# Patient Record
Sex: MALE | Race: OTHER | Employment: OTHER | URBAN - METROPOLITAN AREA
[De-identification: names, ages, dates, MRNs, and addresses within clinical notes are randomized per-mention and may not be internally consistent; named-entity substitution may affect disease eponyms.]

---

## 2022-07-03 ENCOUNTER — APPOINTMENT (OUTPATIENT)
Dept: GENERAL RADIOLOGY | Age: 62
DRG: 194 | End: 2022-07-03
Payer: COMMERCIAL

## 2022-07-03 ENCOUNTER — HOSPITAL ENCOUNTER (INPATIENT)
Age: 62
LOS: 1 days | Discharge: ANOTHER HOSPITAL | DRG: 194 | End: 2022-07-04
Attending: EMERGENCY MEDICINE | Admitting: FAMILY MEDICINE
Payer: COMMERCIAL

## 2022-07-03 DIAGNOSIS — N17.9 AKI (ACUTE KIDNEY INJURY) (HCC): Primary | ICD-10-CM

## 2022-07-03 DIAGNOSIS — R77.8 ELEVATED TROPONIN: ICD-10-CM

## 2022-07-03 DIAGNOSIS — R53.1 GENERAL WEAKNESS: ICD-10-CM

## 2022-07-03 DIAGNOSIS — I95.9 HYPOTENSION, UNSPECIFIED HYPOTENSION TYPE: ICD-10-CM

## 2022-07-03 DIAGNOSIS — E87.20 LACTIC ACIDOSIS: ICD-10-CM

## 2022-07-03 PROBLEM — J18.9 COMMUNITY ACQUIRED PNEUMONIA, BILATERAL: Status: ACTIVE | Noted: 2022-07-03

## 2022-07-03 LAB
ALBUMIN SERPL-MCNC: 4 G/DL (ref 3.5–4.6)
ALP BLD-CCNC: 73 U/L (ref 35–104)
ALT SERPL-CCNC: 14 U/L (ref 0–41)
AMMONIA: 16 UMOL/L (ref 16–60)
ANION GAP SERPL CALCULATED.3IONS-SCNC: 13 MEQ/L (ref 9–15)
APTT: 47.8 SEC (ref 24.4–36.8)
AST SERPL-CCNC: 19 U/L (ref 0–40)
BASOPHILS ABSOLUTE: 0 K/UL (ref 0–0.2)
BASOPHILS RELATIVE PERCENT: 0.1 %
BILIRUB SERPL-MCNC: <0.2 MG/DL (ref 0.2–0.7)
BUN BLDV-MCNC: 31 MG/DL (ref 8–23)
CALCIUM SERPL-MCNC: 12.8 MG/DL (ref 8.5–9.9)
CHLORIDE BLD-SCNC: 101 MEQ/L (ref 95–107)
CO2: 26 MEQ/L (ref 20–31)
CREAT SERPL-MCNC: 1.98 MG/DL (ref 0.7–1.2)
EOSINOPHILS ABSOLUTE: 0 K/UL (ref 0–0.7)
EOSINOPHILS RELATIVE PERCENT: 0.7 %
GFR AFRICAN AMERICAN: 41.7
GFR NON-AFRICAN AMERICAN: 34.4
GLOBULIN: 3.7 G/DL (ref 2.3–3.5)
GLUCOSE BLD-MCNC: 193 MG/DL (ref 70–99)
GLUCOSE BLD-MCNC: 238 MG/DL (ref 70–99)
HCT VFR BLD CALC: 28.4 % (ref 42–52)
HEMOGLOBIN: 9.5 G/DL (ref 14–18)
INR BLD: 2.9
LACTIC ACID: 3.5 MMOL/L (ref 0.5–2.2)
LYMPHOCYTES ABSOLUTE: 1.2 K/UL (ref 1–4.8)
LYMPHOCYTES RELATIVE PERCENT: 21 %
MAGNESIUM: 2.4 MG/DL (ref 1.7–2.4)
MCH RBC QN AUTO: 29.6 PG (ref 27–31.3)
MCHC RBC AUTO-ENTMCNC: 33.5 % (ref 33–37)
MCV RBC AUTO: 88.2 FL (ref 80–100)
MONOCYTES ABSOLUTE: 0.4 K/UL (ref 0.2–0.8)
MONOCYTES RELATIVE PERCENT: 7.4 %
NEUTROPHILS ABSOLUTE: 4.1 K/UL (ref 1.4–6.5)
NEUTROPHILS RELATIVE PERCENT: 70.8 %
PDW BLD-RTO: 15.3 % (ref 11.5–14.5)
PERFORMED ON: ABNORMAL
PLATELET # BLD: 187 K/UL (ref 130–400)
POTASSIUM SERPL-SCNC: 3.8 MEQ/L (ref 3.4–4.9)
PROCALCITONIN: 1.15 NG/ML (ref 0–0.15)
PROTHROMBIN TIME: 29.3 SEC (ref 12.3–14.9)
RBC # BLD: 3.21 M/UL (ref 4.7–6.1)
SODIUM BLD-SCNC: 140 MEQ/L (ref 135–144)
TOTAL PROTEIN: 7.7 G/DL (ref 6.3–8)
TROPONIN: 0.22 NG/ML (ref 0–0.01)
WBC # BLD: 5.9 K/UL (ref 4.8–10.8)

## 2022-07-03 PROCEDURE — 83605 ASSAY OF LACTIC ACID: CPT

## 2022-07-03 PROCEDURE — 83735 ASSAY OF MAGNESIUM: CPT

## 2022-07-03 PROCEDURE — 83036 HEMOGLOBIN GLYCOSYLATED A1C: CPT

## 2022-07-03 PROCEDURE — 71045 X-RAY EXAM CHEST 1 VIEW: CPT

## 2022-07-03 PROCEDURE — G0378 HOSPITAL OBSERVATION PER HR: HCPCS

## 2022-07-03 PROCEDURE — 85730 THROMBOPLASTIN TIME PARTIAL: CPT

## 2022-07-03 PROCEDURE — 6360000002 HC RX W HCPCS: Performed by: INTERNAL MEDICINE

## 2022-07-03 PROCEDURE — 96374 THER/PROPH/DIAG INJ IV PUSH: CPT

## 2022-07-03 PROCEDURE — 99285 EMERGENCY DEPT VISIT HI MDM: CPT

## 2022-07-03 PROCEDURE — 80053 COMPREHEN METABOLIC PANEL: CPT

## 2022-07-03 PROCEDURE — 93005 ELECTROCARDIOGRAM TRACING: CPT | Performed by: EMERGENCY MEDICINE

## 2022-07-03 PROCEDURE — 6360000002 HC RX W HCPCS: Performed by: EMERGENCY MEDICINE

## 2022-07-03 PROCEDURE — 87040 BLOOD CULTURE FOR BACTERIA: CPT

## 2022-07-03 PROCEDURE — 84145 PROCALCITONIN (PCT): CPT

## 2022-07-03 PROCEDURE — 6370000000 HC RX 637 (ALT 250 FOR IP): Performed by: FAMILY MEDICINE

## 2022-07-03 PROCEDURE — 84484 ASSAY OF TROPONIN QUANT: CPT

## 2022-07-03 PROCEDURE — 2580000003 HC RX 258: Performed by: FAMILY MEDICINE

## 2022-07-03 PROCEDURE — 96375 TX/PRO/DX INJ NEW DRUG ADDON: CPT

## 2022-07-03 PROCEDURE — 85610 PROTHROMBIN TIME: CPT

## 2022-07-03 PROCEDURE — 36415 COLL VENOUS BLD VENIPUNCTURE: CPT

## 2022-07-03 PROCEDURE — 2580000003 HC RX 258: Performed by: EMERGENCY MEDICINE

## 2022-07-03 PROCEDURE — 82140 ASSAY OF AMMONIA: CPT

## 2022-07-03 PROCEDURE — 6370000000 HC RX 637 (ALT 250 FOR IP): Performed by: INTERNAL MEDICINE

## 2022-07-03 PROCEDURE — 6360000002 HC RX W HCPCS: Performed by: FAMILY MEDICINE

## 2022-07-03 PROCEDURE — 85025 COMPLETE CBC W/AUTO DIFF WBC: CPT

## 2022-07-03 RX ORDER — SODIUM CHLORIDE 0.9 % (FLUSH) 0.9 %
5-40 SYRINGE (ML) INJECTION PRN
Status: DISCONTINUED | OUTPATIENT
Start: 2022-07-03 | End: 2022-07-04 | Stop reason: HOSPADM

## 2022-07-03 RX ORDER — TACROLIMUS 0.5 MG/1
0.5 CAPSULE ORAL 2 TIMES DAILY
Status: DISCONTINUED | OUTPATIENT
Start: 2022-07-03 | End: 2022-07-04 | Stop reason: HOSPADM

## 2022-07-03 RX ORDER — ONDANSETRON 2 MG/ML
4 INJECTION INTRAMUSCULAR; INTRAVENOUS EVERY 6 HOURS PRN
Status: DISCONTINUED | OUTPATIENT
Start: 2022-07-03 | End: 2022-07-04 | Stop reason: HOSPADM

## 2022-07-03 RX ORDER — AZITHROMYCIN 500 MG/1
500 TABLET, FILM COATED ORAL EVERY 24 HOURS
Status: DISCONTINUED | OUTPATIENT
Start: 2022-07-03 | End: 2022-07-04 | Stop reason: HOSPADM

## 2022-07-03 RX ORDER — ONDANSETRON 4 MG/1
4 TABLET, ORALLY DISINTEGRATING ORAL EVERY 8 HOURS PRN
Status: DISCONTINUED | OUTPATIENT
Start: 2022-07-03 | End: 2022-07-04 | Stop reason: HOSPADM

## 2022-07-03 RX ORDER — GABAPENTIN 300 MG/1
300 CAPSULE ORAL 3 TIMES DAILY
COMMUNITY
Start: 2022-05-31 | End: 2022-11-27

## 2022-07-03 RX ORDER — INSULIN GLARGINE 100 [IU]/ML
22 INJECTION, SOLUTION SUBCUTANEOUS NIGHTLY
Status: DISCONTINUED | OUTPATIENT
Start: 2022-07-03 | End: 2022-07-04

## 2022-07-03 RX ORDER — PANTOPRAZOLE SODIUM 40 MG/1
40 TABLET, DELAYED RELEASE ORAL DAILY
COMMUNITY
Start: 2022-05-05

## 2022-07-03 RX ORDER — POLYETHYLENE GLYCOL 3350 17 G/17G
17 POWDER, FOR SOLUTION ORAL DAILY PRN
Status: DISCONTINUED | OUTPATIENT
Start: 2022-07-03 | End: 2022-07-04 | Stop reason: HOSPADM

## 2022-07-03 RX ORDER — 0.9 % SODIUM CHLORIDE 0.9 %
1000 INTRAVENOUS SOLUTION INTRAVENOUS ONCE
Status: COMPLETED | OUTPATIENT
Start: 2022-07-03 | End: 2022-07-03

## 2022-07-03 RX ORDER — ACETAMINOPHEN 650 MG/1
650 SUPPOSITORY RECTAL EVERY 6 HOURS PRN
Status: DISCONTINUED | OUTPATIENT
Start: 2022-07-03 | End: 2022-07-04 | Stop reason: HOSPADM

## 2022-07-03 RX ORDER — LANOLIN ALCOHOL/MO/W.PET/CERES
100 CREAM (GRAM) TOPICAL DAILY
COMMUNITY
Start: 2022-04-01

## 2022-07-03 RX ORDER — ENOXAPARIN SODIUM 100 MG/ML
70 INJECTION SUBCUTANEOUS 2 TIMES DAILY
COMMUNITY
Start: 2022-06-15

## 2022-07-03 RX ORDER — FENTANYL CITRATE 50 UG/ML
50 INJECTION, SOLUTION INTRAMUSCULAR; INTRAVENOUS ONCE
Status: COMPLETED | OUTPATIENT
Start: 2022-07-03 | End: 2022-07-03

## 2022-07-03 RX ORDER — FLUDROCORTISONE ACETATE 0.1 MG/1
0.1 TABLET ORAL DAILY
COMMUNITY
Start: 2022-04-26

## 2022-07-03 RX ORDER — INSULIN LISPRO 100 [IU]/ML
INJECTION, SOLUTION INTRAVENOUS; SUBCUTANEOUS
COMMUNITY
Start: 2022-03-31

## 2022-07-03 RX ORDER — SODIUM CHLORIDE 0.9 % (FLUSH) 0.9 %
5-40 SYRINGE (ML) INJECTION EVERY 12 HOURS SCHEDULED
Status: DISCONTINUED | OUTPATIENT
Start: 2022-07-03 | End: 2022-07-04 | Stop reason: HOSPADM

## 2022-07-03 RX ORDER — ENOXAPARIN SODIUM 100 MG/ML
40 INJECTION SUBCUTANEOUS DAILY
Status: DISCONTINUED | OUTPATIENT
Start: 2022-07-03 | End: 2022-07-03

## 2022-07-03 RX ORDER — DEXTROSE MONOHYDRATE 50 MG/ML
100 INJECTION, SOLUTION INTRAVENOUS PRN
Status: DISCONTINUED | OUTPATIENT
Start: 2022-07-03 | End: 2022-07-04 | Stop reason: HOSPADM

## 2022-07-03 RX ORDER — INSULIN LISPRO 100 [IU]/ML
0-6 INJECTION, SOLUTION INTRAVENOUS; SUBCUTANEOUS NIGHTLY
Status: DISCONTINUED | OUTPATIENT
Start: 2022-07-03 | End: 2022-07-04

## 2022-07-03 RX ORDER — SODIUM CHLORIDE 9 MG/ML
INJECTION, SOLUTION INTRAVENOUS CONTINUOUS
Status: DISCONTINUED | OUTPATIENT
Start: 2022-07-03 | End: 2022-07-04 | Stop reason: HOSPADM

## 2022-07-03 RX ORDER — VENLAFAXINE HYDROCHLORIDE 75 MG/1
75 CAPSULE, EXTENDED RELEASE ORAL DAILY
Status: DISCONTINUED | OUTPATIENT
Start: 2022-07-04 | End: 2022-07-04 | Stop reason: HOSPADM

## 2022-07-03 RX ORDER — WARFARIN SODIUM 2.5 MG/1
TABLET ORAL
COMMUNITY
Start: 2022-01-13

## 2022-07-03 RX ORDER — INSULIN GLARGINE 100 [IU]/ML
22 INJECTION, SOLUTION SUBCUTANEOUS NIGHTLY
Status: DISCONTINUED | OUTPATIENT
Start: 2022-07-03 | End: 2022-07-03 | Stop reason: SDUPTHER

## 2022-07-03 RX ORDER — INSULIN LISPRO 100 [IU]/ML
0-12 INJECTION, SOLUTION INTRAVENOUS; SUBCUTANEOUS
Status: DISCONTINUED | OUTPATIENT
Start: 2022-07-04 | End: 2022-07-04

## 2022-07-03 RX ORDER — SULFAMETHOXAZOLE AND TRIMETHOPRIM 400; 80 MG/1; MG/1
1 TABLET ORAL DAILY
COMMUNITY
Start: 2022-04-05

## 2022-07-03 RX ORDER — ONDANSETRON 2 MG/ML
4 INJECTION INTRAMUSCULAR; INTRAVENOUS ONCE
Status: COMPLETED | OUTPATIENT
Start: 2022-07-03 | End: 2022-07-03

## 2022-07-03 RX ORDER — INSULIN ASPART 100 [IU]/ML
INJECTION, SOLUTION INTRAVENOUS; SUBCUTANEOUS
COMMUNITY
Start: 2022-04-22

## 2022-07-03 RX ORDER — TACROLIMUS 0.5 MG/1
0.5 CAPSULE ORAL 2 TIMES DAILY
COMMUNITY
Start: 2022-05-31

## 2022-07-03 RX ORDER — SODIUM CHLORIDE 9 MG/ML
INJECTION, SOLUTION INTRAVENOUS PRN
Status: DISCONTINUED | OUTPATIENT
Start: 2022-07-03 | End: 2022-07-04 | Stop reason: HOSPADM

## 2022-07-03 RX ORDER — VENLAFAXINE HYDROCHLORIDE 37.5 MG/1
75 CAPSULE, EXTENDED RELEASE ORAL DAILY
COMMUNITY
Start: 2022-06-06 | End: 2022-12-03

## 2022-07-03 RX ORDER — GABAPENTIN 300 MG/1
300 CAPSULE ORAL 3 TIMES DAILY
Status: DISCONTINUED | OUTPATIENT
Start: 2022-07-03 | End: 2022-07-04 | Stop reason: HOSPADM

## 2022-07-03 RX ORDER — ACETAMINOPHEN 325 MG/1
650 TABLET ORAL EVERY 6 HOURS PRN
Status: DISCONTINUED | OUTPATIENT
Start: 2022-07-03 | End: 2022-07-04 | Stop reason: HOSPADM

## 2022-07-03 RX ORDER — MAGNESIUM OXIDE 400 MG/1
400 TABLET ORAL 2 TIMES DAILY
COMMUNITY
Start: 2022-06-06

## 2022-07-03 RX ADMIN — TACROLIMUS 0.5 MG: 0.5 CAPSULE ORAL at 22:25

## 2022-07-03 RX ADMIN — INSULIN GLARGINE 22 UNITS: 100 INJECTION, SOLUTION SUBCUTANEOUS at 22:27

## 2022-07-03 RX ADMIN — METOPROLOL TARTRATE 25 MG: 25 TABLET, FILM COATED ORAL at 22:25

## 2022-07-03 RX ADMIN — ACETAMINOPHEN 650 MG: 325 TABLET ORAL at 22:24

## 2022-07-03 RX ADMIN — FENTANYL CITRATE 50 MCG: 50 INJECTION, SOLUTION INTRAMUSCULAR; INTRAVENOUS at 17:29

## 2022-07-03 RX ADMIN — AZITHROMYCIN MONOHYDRATE 500 MG: 500 TABLET ORAL at 22:25

## 2022-07-03 RX ADMIN — SODIUM CHLORIDE 1000 ML: 9 INJECTION, SOLUTION INTRAVENOUS at 17:34

## 2022-07-03 RX ADMIN — ONDANSETRON 4 MG: 2 INJECTION INTRAMUSCULAR; INTRAVENOUS at 17:28

## 2022-07-03 RX ADMIN — CEFTRIAXONE SODIUM 1000 MG: 1 INJECTION, POWDER, FOR SOLUTION INTRAMUSCULAR; INTRAVENOUS at 22:36

## 2022-07-03 RX ADMIN — SODIUM CHLORIDE: 9 INJECTION, SOLUTION INTRAVENOUS at 19:46

## 2022-07-03 RX ADMIN — Medication 10 ML: at 22:39

## 2022-07-03 RX ADMIN — GABAPENTIN 300 MG: 300 CAPSULE ORAL at 22:25

## 2022-07-03 RX ADMIN — INSULIN LISPRO 2 UNITS: 100 INJECTION, SOLUTION INTRAVENOUS; SUBCUTANEOUS at 22:27

## 2022-07-03 ASSESSMENT — PAIN SCALES - GENERAL
PAINLEVEL_OUTOF10: 10
PAINLEVEL_OUTOF10: 3
PAINLEVEL_OUTOF10: 10
PAINLEVEL_OUTOF10: 0

## 2022-07-03 ASSESSMENT — PAIN DESCRIPTION - ORIENTATION
ORIENTATION: LEFT
ORIENTATION: LEFT

## 2022-07-03 ASSESSMENT — PAIN DESCRIPTION - LOCATION
LOCATION: LEG

## 2022-07-03 ASSESSMENT — PAIN DESCRIPTION - PAIN TYPE: TYPE: CHRONIC PAIN

## 2022-07-03 ASSESSMENT — ENCOUNTER SYMPTOMS
BACK PAIN: 0
SORE THROAT: 0
CHEST TIGHTNESS: 0
SHORTNESS OF BREATH: 0
COUGH: 0
ABDOMINAL PAIN: 0
VOMITING: 0
DIARRHEA: 0
NAUSEA: 0

## 2022-07-03 ASSESSMENT — PAIN - FUNCTIONAL ASSESSMENT: PAIN_FUNCTIONAL_ASSESSMENT: 0-10

## 2022-07-03 NOTE — H&P
glargine (LANTUS;BASAGLAR) 100 UNIT/ML injection pen Inject 22 Units into the skin nightly 3/31/22  Yes Historical Provider, MD   insulin aspart (NOVOLOG FLEXPEN) 100 UNIT/ML injection pen INJECT BEFORE MEALS ON A SLIDING SCALE UP TO 15 UNITS A DAY 4/22/22  Yes Historical Provider, MD   gabapentin (NEURONTIN) 300 MG capsule Take 300 mg by mouth 3 times daily. 5/31/22 11/27/22 Yes Historical Provider, MD   fludrocortisone (FLORINEF) 0.1 MG tablet Take 0.1 mg by mouth daily 4/26/22  Yes Historical Provider, MD   enoxaparin (LOVENOX) 80 MG/0.8ML Inject 70 mg into the skin 2 times daily 6/15/22  Yes Historical Provider, MD       Allergies:  Patient has no known allergies. Social History:   TOBACCO:   has no history on file for tobacco use. ETOH:   has no history on file for alcohol use. Family History:   No family history on file. REVIEW OF SYSTEMS:  Ten systems reviewed and negative except for as above. Physical Exam:    Vitals: BP 94/61   Pulse 78   Temp 97.9 °F (36.6 °C)   Resp 18   Ht 5' 6\" (1.676 m)   Wt 170 lb (77.1 kg)   SpO2 98%   BMI 27.44 kg/m²   Constitutional: alert, appears stated age and cooperative  Skin: Skin color, texture, turgor normal. No rashes or lesions  Eyes:Eye: Normal external eye, conjunctiva, BEA. ENT: Head: Normocephalic, no lesions, without obvious abnormality. Neck: no adenopathy, no carotid bruit, no JVD, supple, symmetrical, trachea midline and thyroid not enlarged, symmetric, no tenderness/mass/nodules  Respiratory: clear to auscultation bilaterally  Cardiovascular: regular rate and rhythm, S1, S2 normal, no murmur, click, rub or gallop  Gastrointestinal: soft, non-tender; bowel sounds normal; no masses,  no organomegaly  Genitourinary: Deferred  Musculoskeletal:extremities normal, atraumatic, no cyanosis or edema  Neurologic: Mental status AAOx3 No facial asymmetry or droop. Normal muscle strength b/l. CN II-XII grossly intact.   Psychiatric: Appropriate mood and affect. Good insight and judgement  Hematologic: No obvious bruising or bleeding    Recent Labs     07/03/22  1639   WBC 5.9   HGB 9.5*        Recent Labs     07/03/22  1639      K 3.8      CO2 26   BUN 31*   CREATININE 1.98*   GLUCOSE 193*   AST 19   ALT 14   BILITOT <0.2   ALKPHOS 73     Troponin T:   Recent Labs     07/03/22  1639   TROPONINI 0.221*       INR:   Recent Labs     07/03/22  1639   INR 2.9     URINALYSIS:No results for input(s): NITRITE, COLORU, PHUR, LABCAST, WBCUA, RBCUA, MUCUS, TRICHOMONAS, YEAST, BACTERIA, CLARITYU, SPECGRAV, LEUKOCYTESUR, UROBILINOGEN, BILIRUBINUR, BLOODU, GLUCOSEU, AMORPHOUS in the last 72 hours. Invalid input(s): Adele Turner  -----------------------------------------------------------------   XR CHEST PORTABLE    Result Date: 7/3/2022  XR CHEST PORTABLE Clinical History:  Weakness. Comparison:  5/20/2011. RESULT: Shallow inspiration. Bibasilar opacity versus atelectasis. Possible small bilateral pleural effusions versus pleural thickening. No pneumothorax. Stable cardiomediastinal silhouette. Surgical clips upper abdomen. No distinct acute osseous finding. Bibasilar opacity versus atelectasis. Possible small bilateral pleural effusions versus pleural thickening. Assessment and Plan   1. Community acquired PNA, bilateral, with hx immunocompromise  1. Ceftriaxone and azithromycin, id consult  2. ZAIN on ckd3  1. Unsure of most recent baseline, at least has ckd 3, this may be near baseline  3. Hx liver transplant x 3, alcoholic cirrhosis  4. DM2  5.  DVT proph    Patient Active Problem List   Diagnosis Code    Community acquired pneumonia, bilateral J18.9       Clair Goodson MD, MD  Admitting Hospitalist    Emergency Contact:

## 2022-07-03 NOTE — ED PROVIDER NOTES
[address] [facility] ED  eMERGENCYdEPARTMENT eNCOUnter      Pt Name: Trista Phelan  MRN: [id number]  Colleengfnazanin 1960  Date of evaluation: 7/3/2022  Bernardo Cruz MD    CHIEF COMPLAINT           HPI  Trista Phelan is a 64 y.o. male per chart review has a h/o alcoholic cirrhosis, liver transplant, spinal stenosis, DM, lupus presents to the ED with c/o gradual onset of constant, moderate, generalized weakness x 3 days. States he is light-headed, feels weak and tired. Pt also notes left thigh pain, makes walking harder. Thigh pain has been chronic for the past year, but worsened recently. Pt denies CP, SOB, HA, dizziness, abd pain, cough, dysuria. ROS  Review of Systems   Constitutional: Positive for fatigue. Negative for activity change, chills and fever. HENT: Negative for ear pain and sore throat. Eyes: Negative for visual disturbance. Respiratory: Negative for cough, chest tightness and shortness of breath. Cardiovascular: Negative for chest pain, palpitations and leg swelling. Gastrointestinal: Negative for abdominal pain, diarrhea, nausea and vomiting. Genitourinary: Negative for dysuria. Musculoskeletal: Negative for back pain and neck pain. Skin: Negative for rash. Neurological: Positive for weakness and light-headedness. Negative for dizziness, tremors, syncope, facial asymmetry, speech difficulty, numbness and headaches. Except as noted above the remainder of the review of systems was reviewed and negative. PAST MEDICAL HISTORY   No past medical history on file. SURGICAL HISTORY     No past surgical history on file. CURRENTMEDICATIONS       Previous Medications    ENOXAPARIN (LOVENOX) 80 MG/0.8ML    Inject 70 mg into the skin 2 times daily    FLUDROCORTISONE (FLORINEF) 0.1 MG TABLET    Take 0.1 mg by mouth daily    GABAPENTIN (NEURONTIN) 300 MG CAPSULE    Take 300 mg by mouth 3 times daily.     INSULIN ASPART (NOVOLOG FLEXPEN) 100 UNIT/ML INJECTION PEN    INJECT BEFORE MEALS ON A SLIDING SCALE UP TO 15 UNITS A DAY    INSULIN GLARGINE (LANTUS;BASAGLAR) 100 UNIT/ML INJECTION PEN    Inject 22 Units into the skin nightly    INSULIN LISPRO (HUMALOG) 100 UNIT/ML SOLN INJECTION VIAL    Inject into the skin    MAGNESIUM OXIDE (MAG-OX) 400 MG TABLET    Take 400 mg by mouth 2 times daily    METOPROLOL TARTRATE (LOPRESSOR) 25 MG TABLET    Take 25 mg by mouth every 12 hours    PANTOPRAZOLE (PROTONIX) 40 MG TABLET    Take 40 mg by mouth daily    SULFAMETHOXAZOLE-TRIMETHOPRIM (BACTRIM;SEPTRA) 400-80 MG PER TABLET    Take 1 tablet by mouth daily    TACROLIMUS (PROGRAF) 0.5 MG CAPSULE    Take 0.5 mg by mouth 2 times daily    THIAMINE 100 MG TABLET    100 mg daily    VENLAFAXINE (EFFEXOR XR) 37.5 MG EXTENDED RELEASE CAPSULE    Take 75 mg by mouth daily    WARFARIN (COUMADIN) 2.5 MG TABLET    Take 3.75mg Mon and 2.5 mg all other days of the week or as directed by Coumadin Clinic. ALLERGIES     Patient has no known allergies. FAMILY HISTORY     No family history on file. SOCIAL HISTORY       Social History     Socioeconomic History    Marital status:      Spouse name: Not on file    Number of children: Not on file    Years of education: Not on file    Highest education level: Not on file   Occupational History    Not on file   Tobacco Use    Smoking status: Not on file    Smokeless tobacco: Not on file   Substance and Sexual Activity    Alcohol use: Not on file    Drug use: Not on file    Sexual activity: Not on file   Other Topics Concern    Not on file   Social History Narrative    Not on file     Social Determinants of Health     Financial Resource Strain:     Difficulty of Paying Living Expenses: Not on file   Food Insecurity:     Worried About Running Out of Food in the Last Year: Not on file    Jayne of Food in the Last Year: Not on file   Transportation Needs:     Lack of Transportation (Medical):  Not on file    Lack of Transportation (Non-Medical): Not on file   Physical Activity:     Days of Exercise per Week: Not on file    Minutes of Exercise per Session: Not on file   Stress:     Feeling of Stress : Not on file   Social Connections:     Frequency of Communication with Friends and Family: Not on file    Frequency of Social Gatherings with Friends and Family: Not on file    Attends Voodoo Services: Not on file    Active Member of [employer] or Organizations: Not on file    Attends Club or Organization Meetings: Not on file    Marital Status: Not on file   Intimate Partner Violence:     Fear of Current or Ex-Partner: Not on file    Emotionally Abused: Not on file    Physically Abused: Not on file    Sexually Abused: Not on file   Housing Stability:     Unable to Pay for Housing in the Last Year: Not on file    Number of [place] in the Last Year: Not on file    Unstable Housing in the Last Year: Not on file         PHYSICAL EXAM       ED Triage Vitals [07/03/22 1500]   BP Temp Temp src Heart Rate Resp SpO2 Height Weight   94/61 97.9 °F (36.6 °C) -- 78 18 98 % 5' 6\" (1.676 m) 170 lb (77.1 kg)       Physical Exam  Vitals and nursing note reviewed. Constitutional:       General: He is not in acute distress. Appearance: Normal appearance. He is well-developed. He is not ill-appearing or toxic-appearing. HENT:      Head: Normocephalic and atraumatic. Right Ear: External ear normal.      Left Ear: External ear normal.      Nose: Nose normal.      Mouth/Throat:      Mouth: Mucous membranes are moist.   Eyes:      Conjunctiva/sclera: Conjunctivae normal.      Pupils: Pupils are equal, round, and reactive to light. Cardiovascular:      Rate and Rhythm: Normal rate and regular rhythm. Heart sounds: Normal heart sounds. Pulmonary:      Effort: Pulmonary effort is normal. No respiratory distress. Breath sounds: Normal breath sounds.    Abdominal:      General: Bowel sounds are normal. There is no distension. Palpations: Abdomen is soft. Tenderness: There is no abdominal tenderness. There is no right CVA tenderness, left CVA tenderness, guarding or rebound. Musculoskeletal:         General: Normal range of motion. Cervical back: Normal range of motion and neck supple. No rigidity. Right upper leg: Normal.      Left upper leg: Normal.   Skin:     General: Skin is warm and dry. Neurological:      Mental Status: He is alert and oriented to person, place, and time. MDM     Pt is a 65 yo male who presents to the ED with generalized weakness and lightheadedness as well as left thigh pain. Pt is afebrile however bp noted to be 94/61. Given 1 L NS in the ED. EKG shows NSR with HR 74, normal axis, RBBB, no acute ST changes. Labs remarkable for elevated BUN 31 and Cr 1.98, chronically elevated baseline BUN 29, Cr. 2.08. Elevated lactic acid 3.5. Elevated procalcitonin 1.15. Elevated troponin 0.221, suspect likely from ZAIN. CXR shows bibasilar opacity vs atelectasis. Suspect generalized weakness due to PNA/sepsis vs ZAIN. Gave IV Fentanyl, IV Zofran for thigh pain. Patient states thigh pain has been chronic from his spinal stenosis. Pt re-assessed and pain is gone. Pt is stable. BP re-checked 143/83. Given ZAIN, elevated lactic acid, troponin elevation, elevated procalcitonin, initial hypotension, concern for sepsis, pt discussed case with Dr. Dimple Gordon (hospitalist) and pt admitted to medicine in stable condition. FINAL IMPRESSION      1. ZAIN (acute kidney injury) ([facility] [address]    2. General weakness    3. Elevated troponin    4. Lactic acidosis    5.  Hypotension, unspecified hypotension type          DISPOSITION/PLAN   DISPOSITION Admitted 07/03/2022 06:21:54 PM        DISCHARGE MEDICATIONS:  [unfilled]         Eagle Manzanares MD(electronically signed)  Attending Emergency Physician            Eagle Manzanares MD  07/03/22 2827

## 2022-07-03 NOTE — CARE COORDINATION
[facility] Case Management Initial Discharge Assessment    Met with Patient to discuss discharge plan. PCP:     Dr Carmina Stiles                            Date of Last Visit: 1 month    [facility] Patient: No        VA Notified: no    If no PCP, list provided? N/A    Discharge Planning    Living Arrangements: independently at home    Who do you live with? wife    Who helps you with your care:  self or spouse    If lives at home:     Do you have any barriers navigating in your home? no    Patient can perform ADL? Yes    Current Services (outpatient and in home) :  [facility] [employer] ([address] provides a nurse once a week to assess him and do his labs.)    Dialysis: No    Is transportation available to get to your appointments? Yes    DME Equipment:  yes - walker, cane, wheel chair    Respiratory equipment: None    Respiratory provider:  no     Pharmacy:  yes - [facility]    Consult with Medication Assistance Program?  No        Does Patient Have a High-Risk for Readmission Diagnosis (CHF, PN, MI, COPD)? Yes    If Yes,     Consult with pulmonologist? No   Consult with cardiologist? No   Cardiac Rehab referral if EF <35%? N/A   Consult with Pharmacy for medication assessment prior to discharge? No   Consult with Behavioral health to aid in depression, anxiety, or coping issues? No   Palliative Care Consult? No   Pulmonary Rehab order for COPD, PN, and CHF (if EF > 35%)? No    Does patient have a reliable scale and know how to read it (for CHF)? N/A   Nutrition consult for CHF? N/A   Respiratory therapy consult that includes bedside instruction on administration of nebulizers and/or inhalers, and assessment of oxygen and equipment needs in the home? No    Initial Discharge Plan? (Note: please see concurrent daily documentation for any updates after initial note).       Pt states that he may need rehab and that he would choose the [facility] rehab facility and that if he was not accepted there his second choice would be to return to the [facility] where he was recently d/c from. Cm to assess for d/c needs and referrals.     Readmission Risk              Risk of Unplanned Readmission:  0         Electronically signed by Moe Zimmerman RN on 7/3/2022 at 6:53 PM

## 2022-07-03 NOTE — ED TRIAGE NOTES
Arrived [facility]   Pt c/o left leg pain 10/10 that he has had for over a year but worse  Weakness per the wife, dizzy light headed one episode of emesis   bp 61/43 given 500 ML NS by EM S    Walks with a walker at home   Hx of 3 liver transplants   Pt weak about 3 days

## 2022-07-04 ENCOUNTER — APPOINTMENT (OUTPATIENT)
Dept: GENERAL RADIOLOGY | Age: 62
DRG: 194 | End: 2022-07-04
Payer: COMMERCIAL

## 2022-07-04 VITALS
HEART RATE: 118 BPM | TEMPERATURE: 98.6 F | SYSTOLIC BLOOD PRESSURE: 109 MMHG | HEIGHT: 66 IN | BODY MASS INDEX: 27.32 KG/M2 | WEIGHT: 170 LBS | OXYGEN SATURATION: 100 % | DIASTOLIC BLOOD PRESSURE: 61 MMHG | RESPIRATION RATE: 21 BRPM

## 2022-07-04 PROBLEM — K92.2 GIB (GASTROINTESTINAL BLEEDING): Status: ACTIVE | Noted: 2022-07-04

## 2022-07-04 LAB
ABO/RH: NORMAL
ANION GAP SERPL CALCULATED.3IONS-SCNC: 12 MEQ/L (ref 9–15)
ANTIBODY SCREEN: NORMAL
BASOPHILS ABSOLUTE: 0.1 K/UL (ref 0–0.2)
BASOPHILS RELATIVE PERCENT: 0.7 %
BLOOD BANK DISPENSE STATUS: NORMAL
BLOOD BANK DISPENSE STATUS: NORMAL
BLOOD BANK PRODUCT CODE: NORMAL
BLOOD BANK PRODUCT CODE: NORMAL
BPU ID: NORMAL
BPU ID: NORMAL
BUN BLDV-MCNC: 33 MG/DL (ref 8–23)
CALCIUM SERPL-MCNC: 11.4 MG/DL (ref 8.5–9.9)
CHLORIDE BLD-SCNC: 106 MEQ/L (ref 95–107)
CO2: 24 MEQ/L (ref 20–31)
CREAT SERPL-MCNC: 2.27 MG/DL (ref 0.7–1.2)
DESCRIPTION BLOOD BANK: NORMAL
DESCRIPTION BLOOD BANK: NORMAL
EOSINOPHILS ABSOLUTE: 0.1 K/UL (ref 0–0.7)
EOSINOPHILS RELATIVE PERCENT: 0.5 %
GFR AFRICAN AMERICAN: 35.6
GFR NON-AFRICAN AMERICAN: 29.4
GLUCOSE BLD-MCNC: 244 MG/DL (ref 70–99)
GLUCOSE BLD-MCNC: 262 MG/DL (ref 70–99)
GLUCOSE BLD-MCNC: 271 MG/DL (ref 70–99)
HBA1C MFR BLD: 5.2 % (ref 4.8–5.9)
HCT VFR BLD CALC: 19.2 % (ref 42–52)
HCT VFR BLD CALC: 22 % (ref 42–52)
HEMOGLOBIN: 6.4 G/DL (ref 14–18)
HEMOGLOBIN: 7.6 G/DL (ref 14–18)
INR BLD: 4.2
LYMPHOCYTES ABSOLUTE: 2.4 K/UL (ref 1–4.8)
LYMPHOCYTES RELATIVE PERCENT: 24.4 %
MCH RBC QN AUTO: 30.5 PG (ref 27–31.3)
MCHC RBC AUTO-ENTMCNC: 34.7 % (ref 33–37)
MCV RBC AUTO: 87.9 FL (ref 80–100)
MONOCYTES ABSOLUTE: 0.9 K/UL (ref 0.2–0.8)
MONOCYTES RELATIVE PERCENT: 9.7 %
NEUTROPHILS ABSOLUTE: 6.2 K/UL (ref 1.4–6.5)
NEUTROPHILS RELATIVE PERCENT: 64.7 %
OCCULT BLOOD, OTHER: POSITIVE
PDW BLD-RTO: 15.4 % (ref 11.5–14.5)
PERFORMED ON: ABNORMAL
PERFORMED ON: ABNORMAL
PLATELET # BLD: 207 K/UL (ref 130–400)
POTASSIUM REFLEX MAGNESIUM: 4.7 MEQ/L (ref 3.4–4.9)
PROCALCITONIN: 0.98 NG/ML (ref 0–0.15)
PROTHROMBIN TIME: 39 SEC (ref 12.3–14.9)
RBC # BLD: 2.5 M/UL (ref 4.7–6.1)
SARS-COV-2, NAAT: NOT DETECTED
SODIUM BLD-SCNC: 142 MEQ/L (ref 135–144)
TROPONIN: 0.17 NG/ML (ref 0–0.01)
WBC # BLD: 9.6 K/UL (ref 4.8–10.8)

## 2022-07-04 PROCEDURE — C9113 INJ PANTOPRAZOLE SODIUM, VIA: HCPCS | Performed by: SPECIALIST

## 2022-07-04 PROCEDURE — 80048 BASIC METABOLIC PNL TOTAL CA: CPT

## 2022-07-04 PROCEDURE — 2580000003 HC RX 258: Performed by: FAMILY MEDICINE

## 2022-07-04 PROCEDURE — 85025 COMPLETE CBC W/AUTO DIFF WBC: CPT

## 2022-07-04 PROCEDURE — 6360000002 HC RX W HCPCS: Performed by: INTERNAL MEDICINE

## 2022-07-04 PROCEDURE — 84145 PROCALCITONIN (PCT): CPT

## 2022-07-04 PROCEDURE — 86923 COMPATIBILITY TEST ELECTRIC: CPT

## 2022-07-04 PROCEDURE — 2000000000 HC ICU R&B

## 2022-07-04 PROCEDURE — 6370000000 HC RX 637 (ALT 250 FOR IP): Performed by: INTERNAL MEDICINE

## 2022-07-04 PROCEDURE — C9113 INJ PANTOPRAZOLE SODIUM, VIA: HCPCS | Performed by: INTERNAL MEDICINE

## 2022-07-04 PROCEDURE — 36415 COLL VENOUS BLD VENIPUNCTURE: CPT

## 2022-07-04 PROCEDURE — P9016 RBC LEUKOCYTES REDUCED: HCPCS

## 2022-07-04 PROCEDURE — 71045 X-RAY EXAM CHEST 1 VIEW: CPT

## 2022-07-04 PROCEDURE — 85610 PROTHROMBIN TIME: CPT

## 2022-07-04 PROCEDURE — 36430 TRANSFUSION BLD/BLD COMPNT: CPT

## 2022-07-04 PROCEDURE — 85018 HEMOGLOBIN: CPT

## 2022-07-04 PROCEDURE — 86850 RBC ANTIBODY SCREEN: CPT

## 2022-07-04 PROCEDURE — 6370000000 HC RX 637 (ALT 250 FOR IP): Performed by: FAMILY MEDICINE

## 2022-07-04 PROCEDURE — 99221 1ST HOSP IP/OBS SF/LOW 40: CPT | Performed by: SPECIALIST

## 2022-07-04 PROCEDURE — 82271 OCCULT BLOOD OTHER SOURCES: CPT

## 2022-07-04 PROCEDURE — 2580000003 HC RX 258: Performed by: SPECIALIST

## 2022-07-04 PROCEDURE — 85014 HEMATOCRIT: CPT

## 2022-07-04 PROCEDURE — 84484 ASSAY OF TROPONIN QUANT: CPT

## 2022-07-04 PROCEDURE — 36556 INSERT NON-TUNNEL CV CATH: CPT | Performed by: INTERNAL MEDICINE

## 2022-07-04 PROCEDURE — 87635 SARS-COV-2 COVID-19 AMP PRB: CPT

## 2022-07-04 PROCEDURE — P9059 PLASMA, FRZ BETWEEN 8-24HOUR: HCPCS

## 2022-07-04 PROCEDURE — A4216 STERILE WATER/SALINE, 10 ML: HCPCS | Performed by: SPECIALIST

## 2022-07-04 PROCEDURE — 05HM33Z INSERTION OF INFUSION DEVICE INTO RIGHT INTERNAL JUGULAR VEIN, PERCUTANEOUS APPROACH: ICD-10-PCS | Performed by: INTERNAL MEDICINE

## 2022-07-04 PROCEDURE — 6360000002 HC RX W HCPCS: Performed by: SPECIALIST

## 2022-07-04 PROCEDURE — 2580000003 HC RX 258: Performed by: INTERNAL MEDICINE

## 2022-07-04 PROCEDURE — 99221 1ST HOSP IP/OBS SF/LOW 40: CPT | Performed by: INTERNAL MEDICINE

## 2022-07-04 PROCEDURE — 86900 BLOOD TYPING SEROLOGIC ABO: CPT

## 2022-07-04 PROCEDURE — 99291 CRITICAL CARE FIRST HOUR: CPT | Performed by: INTERNAL MEDICINE

## 2022-07-04 PROCEDURE — A4216 STERILE WATER/SALINE, 10 ML: HCPCS | Performed by: INTERNAL MEDICINE

## 2022-07-04 PROCEDURE — 86901 BLOOD TYPING SEROLOGIC RH(D): CPT

## 2022-07-04 RX ORDER — INSULIN LISPRO 100 [IU]/ML
0-12 INJECTION, SOLUTION INTRAVENOUS; SUBCUTANEOUS EVERY 4 HOURS
Status: DISCONTINUED | OUTPATIENT
Start: 2022-07-04 | End: 2022-07-04 | Stop reason: HOSPADM

## 2022-07-04 RX ORDER — DEXTROSE MONOHYDRATE 50 MG/ML
100 INJECTION, SOLUTION INTRAVENOUS PRN
Status: DISCONTINUED | OUTPATIENT
Start: 2022-07-04 | End: 2022-07-04 | Stop reason: HOSPADM

## 2022-07-04 RX ORDER — SODIUM CHLORIDE 9 MG/ML
INJECTION, SOLUTION INTRAVENOUS PRN
Status: DISCONTINUED | OUTPATIENT
Start: 2022-07-04 | End: 2022-07-04 | Stop reason: HOSPADM

## 2022-07-04 RX ORDER — 0.9 % SODIUM CHLORIDE 0.9 %
1000 INTRAVENOUS SOLUTION INTRAVENOUS ONCE
Status: COMPLETED | OUTPATIENT
Start: 2022-07-04 | End: 2022-07-04

## 2022-07-04 RX ORDER — FLUDROCORTISONE ACETATE 0.1 MG/1
0.1 TABLET ORAL DAILY
Status: DISCONTINUED | OUTPATIENT
Start: 2022-07-04 | End: 2022-07-04 | Stop reason: HOSPADM

## 2022-07-04 RX ORDER — PHYTONADIONE 10 MG/ML
10 INJECTION, EMULSION INTRAMUSCULAR; INTRAVENOUS; SUBCUTANEOUS ONCE
Status: COMPLETED | OUTPATIENT
Start: 2022-07-04 | End: 2022-07-04

## 2022-07-04 RX ADMIN — SODIUM CHLORIDE 40 MG: 9 INJECTION, SOLUTION INTRAMUSCULAR; INTRAVENOUS; SUBCUTANEOUS at 09:06

## 2022-07-04 RX ADMIN — SODIUM CHLORIDE 8 MG/HR: 9 INJECTION, SOLUTION INTRAVENOUS at 14:49

## 2022-07-04 RX ADMIN — VENLAFAXINE HYDROCHLORIDE 75 MG: 75 CAPSULE, EXTENDED RELEASE ORAL at 09:06

## 2022-07-04 RX ADMIN — INSULIN LISPRO 4 UNITS: 100 INJECTION, SOLUTION INTRAVENOUS; SUBCUTANEOUS at 09:13

## 2022-07-04 RX ADMIN — FLUDROCORTISONE ACETATE 0.1 MG: 0.1 TABLET ORAL at 13:05

## 2022-07-04 RX ADMIN — GABAPENTIN 300 MG: 300 CAPSULE ORAL at 09:06

## 2022-07-04 RX ADMIN — PHYTONADIONE 10 MG: 10 INJECTION, EMULSION INTRAMUSCULAR; INTRAVENOUS; SUBCUTANEOUS at 13:05

## 2022-07-04 RX ADMIN — SODIUM CHLORIDE 80 MG: 9 INJECTION, SOLUTION INTRAMUSCULAR; INTRAVENOUS; SUBCUTANEOUS at 14:41

## 2022-07-04 RX ADMIN — SODIUM CHLORIDE: 9 INJECTION, SOLUTION INTRAVENOUS at 12:14

## 2022-07-04 RX ADMIN — Medication 10 ML: at 09:06

## 2022-07-04 RX ADMIN — SODIUM CHLORIDE 1000 ML: 9 INJECTION, SOLUTION INTRAVENOUS at 09:04

## 2022-07-04 RX ADMIN — TACROLIMUS 0.5 MG: 0.5 CAPSULE ORAL at 09:06

## 2022-07-04 ASSESSMENT — ENCOUNTER SYMPTOMS
FACIAL SWELLING: 0
COUGH: 0
RESPIRATORY NEGATIVE: 1
EYE REDNESS: 0
CHEST TIGHTNESS: 0
CHOKING: 0
GASTROINTESTINAL NEGATIVE: 1
APNEA: 0
STRIDOR: 0
EYE DISCHARGE: 0
WHEEZING: 0
SHORTNESS OF BREATH: 0
TROUBLE SWALLOWING: 0

## 2022-07-04 NOTE — PROGRESS NOTES
Pulmonary and Critical Care Medicine  Consult Note  Encounter Date: 2022 12:43 PM    Mr. Jimena Rocha is a 64 y.o. male  : 1960  Requesting Provider: Yazmin Lemon MD    Reason for request: Critical care management          HISTORY OF PRESENT ILLNESS:    Patient is 64 y.o.male who presented  to the emergency department on 7/3/2022 with complaints of weakness, lightheadedness, and left thigh pain. Per the medical record he was hypotensive in the emergency room and did receive a 500 mL normal saline bolus. He does have a history of lupus anticoagulate syndrome and does take Coumadin for this. His INR in the emergency department is 2.9. Other history includes alcohol cirrhosis, liver transplant x3, diabetes mellitus, and spinal stenosis. He was ultimately admitted to the medical floor. Early this morning he did begin to have coffee-ground emesis. He is also anemic with a hemoglobin of 6.4 and his INR today is 4.2. He was transferred to the intensive care unit for closer monitoring. Pulmonary/critical care have been consulted for critical care management. Past Medical History:    Alcoholic liver disease  Lupus anticoagulant  DM    Past Surgical History:    Liver transplant x3    Social History:     reports that he quit smoking about 40 years ago. His smoking use included cigarettes. He smoked 1.00 pack per day. He has never used smokeless tobacco. He reports previous alcohol use. He reports previous drug use. Family History:   No family history on file. Allergies:  Patient has no known allergies.         MEDICATIONS during current hospitalization:    Continuous Infusions:   dextrose      sodium chloride      sodium chloride      sodium chloride      sodium chloride 75 mL/hr at 22 1214    dextrose         Scheduled Meds:   pantoprazole (PROTONIX) 40 mg injection  40 mg IntraVENous BID    phytonadione (ADULT)  10 mg SubCUTAneous Once    insulin lispro  0-12 Units CHEST PORTABLE    Result Date: 7/3/2022  XR CHEST PORTABLE Clinical History:  Weakness. Comparison:  5/20/2011. RESULT: Shallow inspiration. Bibasilar opacity versus atelectasis. Possible small bilateral pleural effusions versus pleural thickening. No pneumothorax. Stable cardiomediastinal silhouette. Surgical clips upper abdomen. No distinct acute osseous finding. Bibasilar opacity versus atelectasis. Possible small bilateral pleural effusions versus pleural thickening. Assessment/Plan:     1. GI bleed--he does have a history of lupus anticoagulate syndrome and does currently utilize Coumadin therapy. He was found to have an INR of 2.9 in the emergency department, today his INR is 4.2. He is also anemic with a hemoglobin of 6.4. He did begin to have coffee-ground emesis early this morning. Sample was sent to the lab and it is positive for occult blood. An ultrasound of the abdomen has been ordered by the hospitalist service. GI has also been consulted. 2. Pneumonia-his chest x-ray does have evidence of bibasilar opacity. He was started on azithromycin and ceftriaxone by the hospitalist service. Infectious diseases also been consulted. 3. Acute kidney injury--he does have evidence of acute kidney injury on his labs this morning. His creatinine today is 1.98. He is receiving IV fluids at this time. Nephrology has been consulted. 4. Anemia--he does have evidence of a GI bleed. His hemoglobin today was 6.4.  1 unit of blood has been ordered by the hospitalist service. We will recheck hemoglobin level 1 hour after transfusion is complete and transfuse if necessary. 5. Elevated INR--he does have a history of lupus anticoagulate syndrome and does take Coumadin for this. His INR on admission was 2.9, today his INR is 4.2. He does have a history of liver transplant x3.  1 unit of FFP has been ordered by the hospitalist service. He was also given a dose of vitamin K.   All anticoagulants have been held at this time. The patient had stopped his Coumadin for EMG approximately a week and a half ago. He had restarted taking Coumadin with a Lovenox bridge. His wife states that he was scheduled to see the pharmacist on Wednesday to determine whether he could stop the Lovenox or not. 6. Elevated troponin--he did have an elevated troponin on this morning's lab work. This is likely demand ischemia related to his anemia and GI bleed. Cardiology was consulted by the hospitalist service. 7. Left thigh hematoma --his wife states that he fell approximately 1 to 2 weeks ago. He had been on anticoagulation at that time. She states that he did have a bump on his left thigh initially but does believe this is gotten bigger. She does feel this is increased in size even since yesterday. Upon examination the patient does have definite hematoma noted on the inner aspect of his left thigh. His left thigh is more swollen than his right. At this time we will continue with reversal of his coagulopathy. Vitamin K and FFP have been ordered. Nutrition: N.p.o.    Code Status: Full code    Prophylaxis: Protonix for GI prophylaxis. SCDs for DVT prophylaxis. This is a 64 y.o. critically ill male from GI bleed. I did spend a total of 35 minutes of critical care time with the patient, and review of the chart, and discussion with the bedside staff. This time is exclusive of any billable procedures. The patient's wife did request transfer to [facility] if possible. This is where the patient has seen multiple specialist in the past and does receive the majority of his care through the [facility] system. This has been initiated by the hospitalist service.     Thank you for consultation    Electronically signed by Ezra Manley DO, on 7/4/2022 at 12:43 PM

## 2022-07-04 NOTE — PROGRESS NOTES
Warfarin Dosing - Pharmacy Consult Note  Consulting Provider: Dr Gabi Gudino  Indication:  History of  VTE/PE  Warfarin Dose prior to admission: 2.5mg daily bridging with enoxaparin at home   Concurrent anticoagulants/antiplatelets: none inpatient  Significant Drug Interactions: macrolides (incr, INR)  Recent Labs     07/03/22  1639 07/04/22  0513   INR 2.9 4.2   HGB 9.5* 7.6*    207   LABALBU 4.0  --      Recent warfarin administrations        No warfarin orders with administrations found. Orders not given:            warfarin placeholder: dosing by pharmacy                   Date   INR    Dose  7/3/22    2.9        2.5mg taken at home  7/4/22    4.2        HOLD    Assessment/Plan  (Goal INR: 2 - 3)  INR of 4.2 is supra-therapeutic. HOLD warfarin today    Active problem list reviewed. INR orders are placed. Chart reviewed for pertinent labs, drug/diet interactions, and past doses. Documentation of patient's clinical condition was reviewed. Pharmacy Dosing:  Pharmacy will continue to follow.

## 2022-07-04 NOTE — FLOWSHEET NOTE
2046 - admission assessment complete. VSS. Home med rec complete, doctor notified. 2 RN skin assessment done with Jcarlos Casillas RN, old surgical scars across the abdomen, pinkness to the bottom noted, no other areas of breakdown noted. Pt oriented to the room and call light placed in reach. Will continue to monitor. Electronically signed by Rhonda Kidd RN on 7/4/2022 at 1:24 AM    0430 - pt called with c/o nausea, this RN went to assess the pt, pt was vomiting into basin. PRN zofran was given per [facility]. Emesis was very dark and had a coffee ground appearance, doctor notified. Doctor ordered gastric occult, sample was sent to lab. Call light remains in reach. will continue to monitor. Electronically signed by Rhonda Kidd RN on 7/4/2022 at 4:39 AM    2638 - pt voided in urinal but PCA dumped it in the toilet without sending a urine sample to lab. This RN gave pt new urinal and will get sample next time pt voids. Call light remains in reach.  Electronically signed by Rhonda Kidd RN on 7/4/2022 at 6:19 AM

## 2022-07-04 NOTE — PROGRESS NOTES
Patient arrived to ICU at 200 from one [address] [place] [address]. Attempted to get another IV in patient, unable. Dr. Sebas Ivey spoke to patient and wife. Agreeable to CVC line. Plasma given per orders. Patient wife requesting transfer to [facility]. Dr. Yury Hopkins aware and making arrangements. CVC line in, no pressors or bolus to be given through live due to placement. 1 unit PRBC started. Patient has bed at [address] [facility] ICU bed 4. Report called [phone] to [facility]. ETA of squad around [phone]. Patient and wife updated. Dr. Fanta Garcia updated on transfer of patient. Electronically signed by Carrol Camp RN on 7/4/2022 at 3:13 PM  [phone]: [facility] transport here and took patient to [facility]. Wife at bedside and took all belongings.  Electronically signed by Carrol Camp RN on 7/4/2022 at 3:31 PM

## 2022-07-04 NOTE — CONSULTS
Consults    Patient Name: Jodie Durham Date: 7/3/2022  2:58 PM  MR #: [id number]  : 1960    Attending Physician: Tessa Blair MD  Reason for consult: GI bleeding    History of Presenting Illness:      Kasey Fonseca is a 64 y.o. male on hospital day 0 with a history of generalized weakness fatigue and not able to ambulate, patient was found to be anemic. He liver transplantation x3 at [facility]. Patient has been on Coumadin because of lupus anticoagulant with history of DVT and pulmonary embolism.,  He dropped his hemoglobin and was subsequently transferred to ICU. Had an episode of emesis of dark brown fluid in ICU, no abdominal pain no history of ulcer disease. Patient had alcoholic liver disease and the first retransplant was because of some complications and the second time it was because of rejection. ,  Patient states that he was admitted in February of this year at [facility] for aspiration pneumonia and had EGD at that time which was reportedly unremarkable. History Obtained From: Patient and spouse      History:      History reviewed. No pertinent past medical history. No past surgical history on file. Family History  No family history on file.   [] Unable to obtain due to ventilated and/ or neurologic status    Social History     Socioeconomic History    Marital status:      Spouse name: Not on file    Number of children: Not on file    Years of education: Not on file    Highest education level: Not on file   Occupational History    Not on file   Tobacco Use    Smoking status: Former Smoker     Packs/day: 1.00     Types: Cigarettes     Quit date: 18     Years since quittin.5    Smokeless tobacco: Never Used   Substance and Sexual Activity    Alcohol use: Not Currently     Comment: 19 years sober    Drug use: Not Currently     Comment: 23 years sober    Sexual activity: Not on file   Other Topics Concern    Not on file   Social History Narrative    Not on file     Social Determinants of Health     Financial Resource Strain:     Difficulty of Paying Living Expenses: Not on file   Food Insecurity:     Worried About Running Out of Food in the Last Year: Not on file    Jayne of Food in the Last Year: Not on file   Transportation Needs:     Lack of Transportation (Medical): Not on file    Lack of Transportation (Non-Medical):  Not on file   Physical Activity:     Days of Exercise per Week: Not on file    Minutes of Exercise per Session: Not on file   Stress:     Feeling of Stress : Not on file   Social Connections:     Frequency of Communication with Friends and Family: Not on file    Frequency of Social Gatherings with Friends and Family: Not on file    Attends Taoist Services: Not on file    Active Member of [address] [place] [address] [employer] or Organizations: Not on file    Attends Club or Organization Meetings: Not on file    Marital Status: Not on file   Intimate Partner Violence:     Fear of Current or Ex-Partner: Not on file    Emotionally Abused: Not on file    Physically Abused: Not on file    Sexually Abused: Not on file   Housing Stability:     Unable to Pay for Housing in the Last Year: Not on file    Number of [place] in the Last Year: Not on file    Unstable Housing in the Last Year: Not on file      [] Unable to obtain due to ventilated and/ or neurologic status      Home Medications:      Medications Prior to Admission: warfarin (COUMADIN) 2.5 MG tablet, Take 3.75mg Mon and 2.5 mg all other days of the week or as directed by [facility].  venlafaxine (EFFEXOR XR) 37.5 MG extended release capsule, Take 75 mg by mouth daily  tacrolimus (PROGRAF) 0.5 MG capsule, Take 0.5 mg by mouth 2 times daily  sulfamethoxazole-trimethoprim (BACTRIM;SEPTRA) 400-80 MG per tablet, Take 1 tablet by mouth daily  pantoprazole (PROTONIX) 40 MG tablet, Take 40 mg by mouth daily  metoprolol tartrate (LOPRESSOR) 25 MG tablet, Take 25 mg by mouth every 12 hours  magnesium oxide (MAG-OX) 400 MG tablet, Take 400 mg by mouth 2 times daily  insulin lispro (HUMALOG) 100 UNIT/ML SOLN injection vial, Inject into the skin  insulin glargine (LANTUS;BASAGLAR) 100 UNIT/ML injection pen, Inject 22 Units into the skin nightly  insulin aspart (NOVOLOG FLEXPEN) 100 UNIT/ML injection pen, INJECT BEFORE MEALS ON A SLIDING SCALE UP TO 15 UNITS A DAY  gabapentin (NEURONTIN) 300 MG capsule, Take 300 mg by mouth 3 times daily. fludrocortisone (FLORINEF) 0.1 MG tablet, Take 0.1 mg by mouth daily  enoxaparin (LOVENOX) 80 MG/0.8ML, Inject 70 mg into the skin 2 times daily  thiamine 100 MG tablet, 100 mg daily (Patient not taking: Reported on 7/3/2022)    Current Hospital Medications:     Scheduled Meds:   pantoprazole (PROTONIX) 40 mg injection  40 mg IntraVENous BID    insulin lispro  0-12 Units SubCUTAneous Q4H    fludrocortisone  0.1 mg Oral Daily    sodium chloride flush  5-40 mL IntraVENous 2 times per day    cefTRIAXone (ROCEPHIN) IV  1,000 mg IntraVENous Q24H    And    azithromycin  500 mg Oral Q24H    gabapentin  300 mg Oral TID    metoprolol tartrate  25 mg Oral Q12H    venlafaxine  75 mg Oral Daily    tacrolimus  0.5 mg Oral BID     Continuous Infusions:   dextrose      sodium chloride      sodium chloride      sodium chloride      sodium chloride      sodium chloride 75 mL/hr at 07/04/22 1214    dextrose       PRN Meds:.glucose, dextrose bolus **OR** dextrose bolus, glucagon (rDNA), dextrose, sodium chloride, sodium chloride, sodium chloride, sodium chloride flush, sodium chloride, ondansetron **OR** ondansetron, polyethylene glycol, acetaminophen **OR** acetaminophen, glucose, dextrose bolus **OR** dextrose bolus, glucagon (rDNA), dextrose  .    dextrose      sodium chloride      sodium chloride      sodium chloride      sodium chloride      sodium chloride 75 mL/hr at 07/04/22 1214    dextrose          Allergies:     No Known Allergies Review of Systems:       [x] CV, Resp, Neuro, , and all other systems reviewed and negative other than listed in HPI.      [] Unable to obtain due to ventilated and/ or neurologic status      Objective Findings:     Vitals:   Vitals:    07/04/22 0058 07/04/22 0757 07/04/22 1015 07/04/22 1050   BP: 126/67 (!) 90/51 (!) 90/51 (!) 106/56   Pulse: (!) 104 (!) 110  93   Resp: 18      Temp: 98.4 °F (36.9 °C)   97.3 °F (36.3 °C)   TempSrc: Oral      SpO2: 98% 93%  (!) 89%   Weight:       Height:            Physical Examination:  General: In no acute distress  HEENT: Normocephalic,  scleral icterus. Conjunctival pallor  Neck: No jugular venous distention. Heart: Regular, no murmur, no rub/gallop. No right ventricular heave. Lungs: Clear to ascultation, no rales/wheezing/rhonchi. Good chest wall excursion. Abdomen: Soft not distended nontender surgical scar seen bowel sounds are present  Extremities: No clubbing/cyanosis, no edema. Skin: Warm, dry, normal turgor, no rash, no bruise, no petichiae. Neuro: No myoclonus or tremor.    Psych: Normal affect    Results/ Medications reviewed 7/4/2022, 1:14 PM     Laboratory, Microbiology, Pathology, Radiology, Cardiology, Medications and Transcriptions reviewed  Scheduled Meds:   pantoprazole (PROTONIX) 40 mg injection  40 mg IntraVENous BID    insulin lispro  0-12 Units SubCUTAneous Q4H    fludrocortisone  0.1 mg Oral Daily    sodium chloride flush  5-40 mL IntraVENous 2 times per day    cefTRIAXone (ROCEPHIN) IV  1,000 mg IntraVENous Q24H    And    azithromycin  500 mg Oral Q24H    gabapentin  300 mg Oral TID    metoprolol tartrate  25 mg Oral Q12H    venlafaxine  75 mg Oral Daily    tacrolimus  0.5 mg Oral BID     Continuous Infusions:   dextrose      sodium chloride      sodium chloride      sodium chloride      sodium chloride      sodium chloride 75 mL/hr at 07/04/22 1214    dextrose         Recent Labs     07/03/22  1639 07/04/22  0577 07/04/22  1024   WBC 5.9 9.6  --    HGB 9.5* 7.6* 6.4*   HCT 28.4* 22.0* 19.2*   MCV 88.2 87.9  --     207  --      Recent Labs     07/03/22  1639 07/04/22  0513    142   K 3.8 4.7    106   CO2 26 24   BUN 31* 33*   CREATININE 1.98* 2.27*     Recent Labs     07/03/22  1639   AST 19   ALT 14   BILITOT <0.2   ALKPHOS 73     No results for input(s): LIPASE, AMYLASE in the last 72 hours. Recent Labs     07/03/22  1639 07/04/22  0513   PROT 7.7  --    INR 2.9 4.2     XR CHEST PORTABLE    Result Date: 7/3/2022  XR CHEST PORTABLE Clinical History:  Weakness. Comparison:  5/20/2011. RESULT: Shallow inspiration. Bibasilar opacity versus atelectasis. Possible small bilateral pleural effusions versus pleural thickening. No pneumothorax. Stable cardiomediastinal silhouette. Surgical clips upper abdomen. No distinct acute osseous finding. Bibasilar opacity versus atelectasis. Possible small bilateral pleural effusions versus pleural thickening. Impression:   59-year-old male status post liver transplant for alcoholic liver disease now admitted with significant anemia and GI bleeding.,  Hemoglobin and hematocrit is 6.4 and 19.2. Patient is on Coumadin because of lupus anticoagulant with history of DVT and PE.,  INR is 4.2, rule out ulcer disease esophagitis, since patient has no evidence of any portal hypertension unlikely to be variceal bleeding. Plan:   INR is being corrected and patient is getting transfusion for anemia, I am told patient might be going to [facility] clinic were all his previous care was done. And patient's family also would like to get him transferred there. We will put the patient on Protonix IV infusion and if he stays here waiting for bed at [facility], we will plan on doing EGD provider his INR is acceptable. Comments: Thank you for allowing us to participate in the care of this patient. Will continue to follow.     Please call if questions or concerns arise.    Electronically signed by Miguel Medina MD on 7/4/2022 at 1:14 PM

## 2022-07-04 NOTE — PROGRESS NOTES
Warfarin Dosing - Pharmacy Consult Note  Consulting Provider: Dr Trinidad Valdez  Indication:  History of  VTE/PE  Warfarin Dose prior to admission: 2.5mg daily bridging with enoxaparin at home   Concurrent anticoagulants/antiplatelets: none inpatient  Significant Drug Interactions: macrolides (incr, INR)  Recent Labs     07/03/22  1639   INR 2.9   HGB 9.5*      LABALBU 4.0     Recent warfarin administrations        No warfarin orders with administrations found. Orders not given:            warfarin placeholder: dosing by pharmacy                   Date   INR    Dose  7/3/22    2.9        2.5mg taken at home    Assessment/Plan  (Goal INR: 2 - 3)  INR is therapeutic and dose taken at home today in addition to enoxaparin bridging. Active problem list reviewed. INR orders are placed. Chart reviewed for pertinent labs, drug/diet interactions, and past doses. Documentation of patient's clinical condition was reviewed. Pharmacy Dosing:  Pharmacy will continue to follow. RC Cole Ph.  7/3/2022  10:48 PM

## 2022-07-04 NOTE — PROCEDURES
hygiene performed prior to central venous catheter insertion  Location details: right internal jugular  Patient position: Trendelenburg  Catheter type: triple lumen  Catheter size: 7 Fr  Pre-procedure: landmarks identified  Ultrasound guidance: yes  Sterile ultrasound techniques: sterile gel and sterile probe covers were used  Number of attempts: 1  Successful placement: yes  Post-procedure: line sutured and dressing applied  Assessment: blood return through all ports and placement verified by x-ray  Patient tolerance: patient tolerated the procedure well with no immediate complications  Comments: After procedure was completed I was able to review the CXR. No PTX was seen. However CVC was noted to malpositioned. RN notified of this can be used for meds and blood products. No vasopressors or boluses. Wife also notified. Patient with history of multiple CVCs in the past and likely with scar tissue present resulting in current placement.             Joana Bentley DO  7/4/2022

## 2022-07-04 NOTE — PROGRESS NOTES
Hospitalist Progress Note      PCP: No primary care provider on file. Date of Admission: 7/3/2022    Chief Complaint:    Chief Complaint   Patient presents with    Leg Pain     left since last year, worse pain weakness light headed      Subjective:  Patient is complaining of hematemesis; feels like its slowing down but he still has 'blood in my throat'. 12 point ROS negative other than mentioned above     Medications:  Reviewed    Infusion Medications    dextrose      sodium chloride      sodium chloride      sodium chloride Stopped (07/04/22 0903)    dextrose       Scheduled Medications    sodium chloride  1,000 mL IntraVENous Once    pantoprazole (PROTONIX) 40 mg injection  40 mg IntraVENous BID    phytonadione (ADULT)  10 mg SubCUTAneous Once    insulin lispro  0-12 Units SubCUTAneous Q4H    sodium chloride flush  5-40 mL IntraVENous 2 times per day    cefTRIAXone (ROCEPHIN) IV  1,000 mg IntraVENous Q24H    And    azithromycin  500 mg Oral Q24H    gabapentin  300 mg Oral TID    metoprolol tartrate  25 mg Oral Q12H    venlafaxine  75 mg Oral Daily    tacrolimus  0.5 mg Oral BID     PRN Meds: glucose, dextrose bolus **OR** dextrose bolus, glucagon (rDNA), dextrose, sodium chloride, sodium chloride flush, sodium chloride, ondansetron **OR** ondansetron, polyethylene glycol, acetaminophen **OR** acetaminophen, glucose, dextrose bolus **OR** dextrose bolus, glucagon (rDNA), dextrose      Intake/Output Summary (Last 24 hours) at 7/4/2022 1003  Last data filed at 7/4/2022 4310  Gross per 24 hour   Intake 1088.37 ml   Output 200 ml   Net 888.37 ml     Exam:    BP (!) 90/51   Pulse (!) 110   Temp 98.4 °F (36.9 °C) (Oral)   Resp 18   Ht 5' 6\" (1.676 m)   Wt 170 lb (77.1 kg)   SpO2 93%   BMI 27.44 kg/m²     General appearance: Pale; ill appearing  HEENT:  Conjunctivae/corneas clear. Neck: Supple, with full range of motion  Respiratory:  Normal respiratory effort.  Clear to auscultation  Cardiovascular: Regular rate and rhythm  Abdomen: Soft, non-tender, non-distended with normal bowel sounds. Surgical scars present  Musculoskeletal: No clubbing, cyanosis or edema bilaterally  Neuro: Non Focal.  No asterixes on examination  Capillary Refill: Brisk,< 3 seconds   Peripheral Pulses: +2 palpable, equal bilaterally     Labs:   Recent Labs     07/03/22  1639 07/04/22  0513   WBC 5.9 9.6   HGB 9.5* 7.6*   HCT 28.4* 22.0*    207     Recent Labs     07/03/22  1639 07/04/22  0513    142   K 3.8 4.7    106   CO2 26 24   BUN 31* 33*   CREATININE 1.98* 2.27*   CALCIUM 12.8* 11.4*     Recent Labs     07/03/22  1639   AST 19   ALT 14   BILITOT <0.2   ALKPHOS 73     Recent Labs     07/03/22  1639 07/04/22  0513   INR 2.9 4.2     Recent Labs     07/03/22  1639   TROPONINI 0.221*       Urinalysis:    No results found for: NITRU, WBCUA, BACTERIA, RBCUA, BLOODU, SPECGRAV, GLUCOSEU    Radiology:  XR CHEST PORTABLE   Final Result      Bibasilar opacity versus atelectasis. Possible small bilateral pleural effusions versus pleural thickening.         Assessment/Plan:      #Upper GIB in the setting of a supratherapeutic INR    - NPO, IVF; IV PPI; transfusion goal of > 8; GI consult    #Pneumonia    - pulm consult; continue CTX/Azithrommycin    #Lupus anticoagulate syndrome; hx of PE/DVT    - supratherapeutic INR; usually on coumadin    - given his Hb has now dropped to 6 with active bleeding and will need central line (discussed with intensivist) will administer FFP in addition to vitamin K; this was also done at [facility] earlier this year for a similar issue    - history of recent IVC filter which has been removed    #Hx of liver transplant    - continue tacrilimus; resume bactrim when ZAIN has resolved    #ZAIN on CKD    - nephrology consult; will get liver ultrasound and urine studies    #MGUS    - outpatient follow up    #CAD s/p BMS    - Elevated troponiin; will consult cardiology but

## 2022-07-05 LAB
EKG ATRIAL RATE: 74 BPM
EKG P AXIS: 30 DEGREES
EKG P-R INTERVAL: 144 MS
EKG Q-T INTERVAL: 420 MS
EKG QRS DURATION: 148 MS
EKG QTC CALCULATION (BAZETT): 466 MS
EKG R AXIS: 2 DEGREES
EKG T AXIS: 26 DEGREES
EKG VENTRICULAR RATE: 74 BPM

## 2022-07-05 NOTE — CONSULTS
Chief Complaint   Patient presents with    Leg Pain     left since last year, worse pain weakness light headed         Patient is a 64 y.o. male who presents with a chief complaint of weakness, fatigue. Patient is followed on a regular basis by Dr. Iris Culp primary care provider on file. July Amanda Hx of ETOH liver cirrhosis s/p liver transplant. Denies hx of cardiac disease  Patient noted to have severe anemia and hemoptysis. Planning on transfer to [facility]. + mild trop elevation in a flat pattern in setting of ZAIN. Denies any CP. History reviewed. No pertinent past medical history. Patient Active Problem List   Diagnosis    Community acquired pneumonia, bilateral    GIB (gastrointestinal bleeding)    ZAIN (acute kidney injury) ([facility] [address]       No past surgical history on file. 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    Drug use: Not Currently     Comment: [id number] [facility] years sober    Sexual activity: None   Other Topics Concern    None   Social History Narrative    None     Social Determinants of Health     Financial Resource Strain:     Difficulty of Paying Living Expenses: Not on file   Food Insecurity:     Worried About Running Out of Food in the Last Year: Not on file    Jayne of Food in the Last Year: Not on file   Transportation Needs:     Lack of Transportation (Medical): Not on file    Lack of Transportation (Non-Medical):  Not on file   Physical Activity:     Days of Exercise per Week: Not on file    Minutes of Exercise per Session: Not on file   Stress:     Feeling of Stress : Not on file   Social Connections:     Frequency of Communication with Friends and Family: Not on file    Frequency of Social Gatherings with Friends and Family: Not on file    Attends Spiritism Services: Not on file    Active Member of Clubs or Organizations: Not on file    Attends Club or Organization Meetings: Not on file    Marital Status: Not on file   Intimate Partner Violence:     Fear of Current or Ex-Partner: Not on file    Emotionally Abused: Not on file    Physically Abused: Not on file    Sexually Abused: Not on file   Housing Stability:     Unable to Pay for Housing in the Last Year: Not on file    Number of [place] in the Last Year: Not on file    Unstable Housing in the Last Year: Not on file       No family history on file. No current facility-administered medications for this encounter. Current Outpatient Medications   Medication Sig Dispense Refill    warfarin (COUMADIN) 2.5 MG tablet Take 3.75mg Mon and 2.5 mg all other days of the week or as directed by [facility].  venlafaxine (EFFEXOR XR) 37.5 MG extended release capsule Take 75 mg by mouth daily      tacrolimus (PROGRAF) 0.5 MG capsule Take 0.5 mg by mouth 2 times daily      sulfamethoxazole-trimethoprim (BACTRIM;SEPTRA) 400-80 MG per tablet Take 1 tablet by mouth daily      pantoprazole (PROTONIX) 40 MG tablet Take 40 mg by mouth daily      metoprolol tartrate (LOPRESSOR) 25 MG tablet Take 25 mg by mouth every 12 hours      magnesium oxide (MAG-OX) 400 MG tablet Take 400 mg by mouth 2 times daily      insulin lispro (HUMALOG) 100 UNIT/ML SOLN injection vial Inject into the skin      insulin glargine (LANTUS;BASAGLAR) 100 UNIT/ML injection pen Inject 22 Units into the skin nightly      insulin aspart (NOVOLOG FLEXPEN) 100 UNIT/ML injection pen INJECT BEFORE MEALS ON A SLIDING SCALE UP TO 15 UNITS A DAY      gabapentin (NEURONTIN) 300 MG capsule Take 300 mg by mouth 3 times daily.       fludrocortisone (FLORINEF) 0.1 MG tablet Take 0.1 mg by mouth daily      enoxaparin (LOVENOX) 80 MG/0.8ML Inject 70 mg into the skin 2 times daily      thiamine 100 MG tablet 100 mg daily (Patient not taking: Reported on 7/3/2022)         ALLERGIES: Patient has no known allergies. Review of Systems   Constitutional: Negative for activity change, appetite change, chills, diaphoresis, fatigue and fever. HENT: Negative for facial swelling, nosebleeds and trouble swallowing. Eyes: Negative for discharge, redness and visual disturbance. Respiratory: Negative. Negative for apnea, cough, choking, chest tightness, shortness of breath, wheezing and stridor. Cardiovascular: Negative. Gastrointestinal: Negative. Hemoptysis   Genitourinary: Negative for difficulty urinating, flank pain, frequency and hematuria. Musculoskeletal: Negative. Negative for neck pain. Skin: Negative. Neurological: Negative for dizziness, seizures, syncope, speech difficulty, weakness and light-headedness. Psychiatric/Behavioral: Negative for behavioral problems, confusion and sleep disturbance. The patient is not nervous/anxious. VITALS:  Blood pressure 109/61, pulse (!) 118, temperature 98.6 °F (37 °C), resp. rate 21, height 5' 6\" (1.676 m), weight 170 lb (77.1 kg), SpO2 100 %. Body mass index is 27.44 kg/m². Physical Exam  Constitutional:       Appearance: He is well-developed. He is not diaphoretic. HENT:      Head: Normocephalic and atraumatic. Eyes:      Conjunctiva/sclera: Conjunctivae normal.      Pupils: Pupils are equal, round, and reactive to light. Neck:      Thyroid: No thyromegaly. Vascular: Normal carotid pulses. No carotid bruit, hepatojugular reflux or JVD. Trachea: No tracheal deviation. Cardiovascular:      Rate and Rhythm: Normal rate and regular rhythm. Chest Wall: PMI is not displaced. Pulses: Intact distal pulses.            Carotid pulses are 3+ on the 4.9 mEq/L    Chloride 106 95 - 107 mEq/L    CO2 24 20 - 31 mEq/L    Anion Gap 12 9 - 15 mEq/L    Glucose 262 (H) 70 - 99 mg/dL    BUN 33 (H) 8 - 23 mg/dL    CREATININE 2.27 (H) 0.70 - 1.20 mg/dL    GFR Non-African American 29.4 (L) >60    GFR  35.6 (L) >60    Calcium 11.4 (H) 8.5 - 9.9 mg/dL   CBC with Auto Differential    Collection Time: 07/04/22  5:13 AM   Result Value Ref Range    WBC 9.6 4.8 - 10.8 K/uL    RBC 2.50 (L) 4.70 - 6.10 M/uL    Hemoglobin 7.6 (L) 14.0 - 18.0 g/dL    Hematocrit 22.0 (L) 42.0 - 52.0 %    MCV 87.9 80.0 - 100.0 fL    MCH 30.5 27.0 - 31.3 pg    MCHC 34.7 33.0 - 37.0 %    RDW 15.4 (H) 11.5 - 14.5 %    Platelets 907 243 - 654 K/uL    Neutrophils % 64.7 %    Lymphocytes % 24.4 %    Monocytes % 9.7 %    Eosinophils % 0.5 %    Basophils % 0.7 %    Neutrophils Absolute 6.2 1.4 - 6.5 K/uL    Lymphocytes Absolute 2.4 1.0 - 4.8 K/uL    Monocytes Absolute 0.9 (H) 0.2 - 0.8 K/uL    Eosinophils Absolute 0.1 0.0 - 0.7 K/uL    Basophils Absolute 0.1 0.0 - 0.2 K/uL   Procalcitonin    Collection Time: 07/04/22  5:13 AM   Result Value Ref Range    Procalcitonin 0.98 (H) 0.00 - 0.15 ng/mL   Protime-INR    Collection Time: 07/04/22  5:13 AM   Result Value Ref Range    Protime 39.0 (H) 12.3 - 14.9 sec    INR 4.2    POCT Glucose    Collection Time: 07/04/22  6:36 AM   Result Value Ref Range    POC Glucose 244 (H) 70 - 99 mg/dl    Performed on ACCU-CHEK    Troponin    Collection Time: 07/04/22  9:35 AM   Result Value Ref Range    Troponin 0.166 (HH) 0.000 - 0.010 ng/mL   TYPE AND SCREEN    Collection Time: 07/04/22  9:44 AM   Result Value Ref Range    ABO/Rh A POS     Antibody Screen NEG    PREPARE RBC (CROSSMATCH), 1 Units    Collection Time: 07/04/22  9:44 AM   Result Value Ref Range    Product Code Blood Bank [id number]     Description Blood Bank Red Blood Cells, Leuko-reduced     Unit Number [id number]     Dispense Status Blood Bank transfused    PREPARE PLASMA, 1 Units    Collection Time: 07/04/22  9:44 AM   Result Value Ref Range    Product Code Blood Bank [id number]     Description Blood Bank Plasma, 5 Day, Thawed     Unit Number [id number]     Dispense Status Blood Bank transfused    Hemoglobin and Hematocrit    Collection Time: 07/04/22 10:24 AM   Result Value Ref Range    Hemoglobin 6.4 (LL) 14.0 - 18.0 g/dL    Hematocrit 19.2 (LL) 42.0 - 52.0 %   POCT Glucose    Collection Time: 07/04/22 10:50 AM   Result Value Ref Range    POC Glucose 271 (H) 70 - 99 mg/dl    Performed on ACCU-CHEK    COVID-19, Rapid    Collection Time: 07/04/22  2:47 PM    Specimen: Nasopharyngeal Swab; Nasal swab   Result Value Ref Range    SARS-CoV-2, NAAT Not Detected Not Detected     Troponin:   Lab Results   Component Value Date/Time    TROPONINI 0.166 07/04/2022 09:35 AM       EKG: normal sinus rhythm, nonspecific ST and T waves changes      ASSESSMENT:    Active Hospital Problems    Diagnosis Date Noted    GIB (gastrointestinal bleeding) [K92.2] 07/04/2022     Priority: Medium    ZAIN (acute kidney injury) ([facility] [address] [N17.9]      Priority: Medium    Community acquired pneumonia, bilateral [J18.9] 07/03/2022     Priority: Medium     Elevated trop- likely demand ischemia/ZAIN  GIB  Anemia  Hx of ETOH liver cirrohisis     PLAN:   1. As always, aggressive risk factor modification is strongly recommended. We should adhere to the JNC VIII guidelines for HTN management and the NCEPATP III guidelines for LDL-C management. 2. Check ECHO  3. Cons. Med rx from cardio standpoint. No plans for any invasive workup. 4. Max cardiac med meds  5. Monitor on tele  6. GI/DVT proph  7. GI recs  8. ICU supportive care and management   9. Stable for [facility] transfer if needed. Thank you for allowing me to participate in the care of your patient, please don't hesitate to contact me if you have any further questions.     Electronically signed by Serjio Russo DO on 7/4/2022 at 9:16 PM

## 2022-07-08 LAB
BLOOD CULTURE, ROUTINE: NORMAL
CULTURE, BLOOD 2: NORMAL

## 2025-02-10 ENCOUNTER — HOSPITAL ENCOUNTER (EMERGENCY)
Age: 65
Discharge: HOME | End: 2025-02-10
Attending: STUDENT IN AN ORGANIZED HEALTH CARE EDUCATION/TRAINING PROGRAM
Payer: COMMERCIAL

## 2025-02-10 ENCOUNTER — APPOINTMENT (OUTPATIENT)
Dept: CT IMAGING | Age: 65
End: 2025-02-10
Payer: COMMERCIAL

## 2025-02-10 VITALS
DIASTOLIC BLOOD PRESSURE: 63 MMHG | WEIGHT: 163 LBS | OXYGEN SATURATION: 95 % | BODY MASS INDEX: 26.2 KG/M2 | SYSTOLIC BLOOD PRESSURE: 118 MMHG | HEART RATE: 99 BPM | RESPIRATION RATE: 19 BRPM | TEMPERATURE: 97.3 F | HEIGHT: 66 IN

## 2025-02-10 DIAGNOSIS — E16.2 HYPOGLYCEMIA: ICD-10-CM

## 2025-02-10 DIAGNOSIS — R42 LIGHT HEADEDNESS: Primary | ICD-10-CM

## 2025-02-10 LAB
ALBUMIN SERPL-MCNC: 3.8 G/DL (ref 3.5–4.6)
ALP SERPL-CCNC: 86 U/L (ref 35–104)
ALT SERPL-CCNC: 18 U/L (ref 0–41)
ANION GAP SERPL CALCULATED.3IONS-SCNC: 13 MEQ/L (ref 9–15)
AST SERPL-CCNC: 24 U/L (ref 0–40)
BASOPHILS # BLD: 0.1 K/UL (ref 0–0.2)
BASOPHILS NFR BLD: 0.7 %
BILIRUB SERPL-MCNC: <0.2 MG/DL (ref 0.2–0.7)
BUN SERPL-MCNC: 45 MG/DL (ref 8–23)
CALCIUM SERPL-MCNC: 9.6 MG/DL (ref 8.5–9.9)
CHLORIDE SERPL-SCNC: 105 MEQ/L (ref 95–107)
CO2 SERPL-SCNC: 24 MEQ/L (ref 20–31)
CREAT SERPL-MCNC: 2.64 MG/DL (ref 0.7–1.2)
EOSINOPHIL # BLD: 0.7 K/UL (ref 0–0.7)
EOSINOPHIL NFR BLD: 5.4 %
ERYTHROCYTE [DISTWIDTH] IN BLOOD BY AUTOMATED COUNT: 14 % (ref 11.5–14.5)
ETHANOL PERCENT: NORMAL G/DL
ETHANOLAMINE SERPL-MCNC: <10 MG/DL (ref 0–0.08)
GLOBULIN SER CALC-MCNC: 3.7 G/DL (ref 2.3–3.5)
GLUCOSE BLD-MCNC: 133 MG/DL
GLUCOSE BLD-MCNC: 133 MG/DL (ref 70–99)
GLUCOSE BLD-MCNC: 140 MG/DL (ref 70–99)
GLUCOSE BLD-MCNC: 64 MG/DL (ref 70–99)
GLUCOSE SERPL-MCNC: 37 MG/DL (ref 70–99)
HCT VFR BLD AUTO: 36.7 % (ref 42–52)
HGB BLD-MCNC: 11.2 G/DL (ref 14–18)
LYMPHOCYTES # BLD: 4.8 K/UL (ref 1–4.8)
LYMPHOCYTES NFR BLD: 39.8 %
MAGNESIUM SERPL-MCNC: 2 MG/DL (ref 1.7–2.4)
MCH RBC QN AUTO: 30 PG (ref 27–31.3)
MCHC RBC AUTO-ENTMCNC: 30.5 % (ref 33–37)
MCV RBC AUTO: 98.4 FL (ref 79–92.2)
MONOCYTES # BLD: 1 K/UL (ref 0.2–0.8)
MONOCYTES NFR BLD: 8.5 %
NEUTROPHILS # BLD: 5.4 K/UL (ref 1.4–6.5)
NEUTS SEG NFR BLD: 44.4 %
PERFORMED ON: ABNORMAL
PLATELET # BLD AUTO: 282 K/UL (ref 130–400)
POTASSIUM SERPL-SCNC: 5.4 MEQ/L (ref 3.4–4.9)
PROT SERPL-MCNC: 7.5 G/DL (ref 6.3–8)
RBC # BLD AUTO: 3.73 M/UL (ref 4.7–6.1)
SODIUM SERPL-SCNC: 142 MEQ/L (ref 135–144)
TROPONIN, HIGH SENSITIVITY: 100 NG/L (ref 0–19)
TROPONIN, HIGH SENSITIVITY: 107 NG/L (ref 0–19)
TROPONIN, HIGH SENSITIVITY: 110 NG/L (ref 0–19)
WBC # BLD AUTO: 12.1 K/UL (ref 4.8–10.8)

## 2025-02-10 PROCEDURE — 82077 ASSAY SPEC XCP UR&BREATH IA: CPT

## 2025-02-10 PROCEDURE — 99284 EMERGENCY DEPT VISIT MOD MDM: CPT

## 2025-02-10 PROCEDURE — 36415 COLL VENOUS BLD VENIPUNCTURE: CPT

## 2025-02-10 PROCEDURE — 96360 HYDRATION IV INFUSION INIT: CPT

## 2025-02-10 PROCEDURE — 84484 ASSAY OF TROPONIN QUANT: CPT

## 2025-02-10 PROCEDURE — 85025 COMPLETE CBC W/AUTO DIFF WBC: CPT

## 2025-02-10 PROCEDURE — 70450 CT HEAD/BRAIN W/O DYE: CPT

## 2025-02-10 PROCEDURE — 93005 ELECTROCARDIOGRAM TRACING: CPT

## 2025-02-10 PROCEDURE — 83735 ASSAY OF MAGNESIUM: CPT

## 2025-02-10 PROCEDURE — 80053 COMPREHEN METABOLIC PANEL: CPT

## 2025-02-10 PROCEDURE — 2580000003 HC RX 258: Performed by: STUDENT IN AN ORGANIZED HEALTH CARE EDUCATION/TRAINING PROGRAM

## 2025-02-10 RX ADMIN — DEXTROSE MONOHYDRATE 500 ML: 50 INJECTION, SOLUTION INTRAVENOUS at 19:51

## 2025-02-10 ASSESSMENT — PAIN - FUNCTIONAL ASSESSMENT: PAIN_FUNCTIONAL_ASSESSMENT: 0-10

## 2025-02-10 ASSESSMENT — PAIN SCALES - GENERAL: PAINLEVEL_OUTOF10: 0

## 2025-02-10 NOTE — ED NOTES
Dizziness (Near syncopal episode. States he was sitting down, felt \"weird\", then vomiting. Denies LOC, but states he nearly blacked out. Patient came here by squad. Patient states like his normal self at this time. Patient states he felt normal prior to episode.

## 2025-02-11 LAB
EKG ATRIAL RATE: 80 BPM
EKG P AXIS: 28 DEGREES
EKG P-R INTERVAL: 132 MS
EKG Q-T INTERVAL: 380 MS
EKG QRS DURATION: 130 MS
EKG QTC CALCULATION (BAZETT): 438 MS
EKG R AXIS: -15 DEGREES
EKG T AXIS: 21 DEGREES
EKG VENTRICULAR RATE: 80 BPM

## 2025-02-11 PROCEDURE — 93010 ELECTROCARDIOGRAM REPORT: CPT | Performed by: INTERNAL MEDICINE

## 2025-02-11 ASSESSMENT — ENCOUNTER SYMPTOMS
COLOR CHANGE: 0
NAUSEA: 1
CHEST TIGHTNESS: 0
EYE DISCHARGE: 0
ABDOMINAL PAIN: 0
RHINORRHEA: 0
COUGH: 0
SINUS PAIN: 0
ABDOMINAL DISTENTION: 0
EYE REDNESS: 0
SHORTNESS OF BREATH: 0
VOMITING: 1
CONSTIPATION: 0
EYE ITCHING: 0
DIARRHEA: 0
EYE PAIN: 0

## 2025-02-11 NOTE — ED NOTES
Critical glucose resulted, and the patient did not seem low as he ambulated to room 10. A POCG done at the bedside with a result of 56.

## 2025-02-11 NOTE — ED PROVIDER NOTES
administer D5 bolus 500 mL given concern that patient might have been hypoglycemic that caused his episode of confusion. He does endorse taking insulin, he endorses not taking any extra and took his usual dose and he did eat and drink today prior to going to the cancer center.  Through chart review it does appear that patient takes insulin aspart U-100, 7 units at breakfast and lunch and 9 units at dinner +2 for every 50 above 150 up to 40 units/day. He's not on oral hypoglycemics.  He did remember taking his insulin as prescribed today but he did eat and drink.  In the meantime, patient had no significant lecture light disturbance besides a slightly high potassium of 5.4.  Again, EKG did not reveal any diffuse T wave elevations or wide QRS pattern.  Patient's high-sensitivity troponins were 110, 107 and 100.  Again, he denied any chest pain or palpitations or shortness of breath and his EKG did not reveal any acute ST abnormalities.  Based off chart review, his troponin tests back in 2023 were in the 200s and thus this does appear to be improved from his baseline, it appears that he does have an elevated baseline with testing of his troponins.    Patient's BUN/creatinine was 45/2.64, back on 1/17/2025 his BUN/creatinine was 67/2.21.  Thus, no significant ZAIN at this time as his BUN appears improved although his creatinine is a bit elevated.  Leukocytosis of 12 was present although his hemoglobin of 11 appears baseline.       Nursing Notes were reviewed.    REVIEW OF SYSTEMS       Review of Systems   Constitutional:  Negative for activity change, appetite change, chills, diaphoresis, fatigue and fever.   HENT:  Negative for congestion, postnasal drip, rhinorrhea and sinus pain.    Eyes:  Negative for pain, discharge, redness and itching.   Respiratory:  Negative for cough, chest tightness and shortness of breath.    Cardiovascular:  Negative for chest pain, palpitations and leg swelling.   Gastrointestinal:

## 2025-02-11 NOTE — DISCHARGE INSTRUCTIONS
You were seen in the ER today due to an episode where you lost consciousness for short while.  Your blood sugar was low into the 30s and thus we did have to give you some dextrose.  Your blood sugar has continually been normal which is reassuring.  Your heart enzymes are a bit elevated although it seems that they usually run between 100-2 100s and this could be related to your chronic kidney disease.  Your EKG did not reveal any concerning signs for heart failure.  You do have chronic kidney disease which we do see based off your labs but no significant electrolyte abnormality.  CT of the brain showed an old stroke but nothing new.  Please continue to follow-up with your outpatient providers.  Please keep close watch of her blood glucose at home and continue to eat and drink as appropriate.  Please return to develop any lightheadedness, dizziness or nausea or vomiting.  Please watch out for any chest pain or palpitations or shortness of breath.

## 2025-02-14 ENCOUNTER — APPOINTMENT (OUTPATIENT)
Dept: ULTRASOUND IMAGING | Age: 65
DRG: 870 | End: 2025-02-14
Payer: COMMERCIAL

## 2025-02-14 ENCOUNTER — APPOINTMENT (OUTPATIENT)
Age: 65
DRG: 870 | End: 2025-02-14
Attending: INTERNAL MEDICINE
Payer: COMMERCIAL

## 2025-02-14 ENCOUNTER — HOSPITAL ENCOUNTER (INPATIENT)
Age: 65
LOS: 5 days | Discharge: ANOTHER HOSPITAL | DRG: 870 | End: 2025-02-19
Attending: INTERNAL MEDICINE | Admitting: INTERNAL MEDICINE
Payer: COMMERCIAL

## 2025-02-14 ENCOUNTER — APPOINTMENT (OUTPATIENT)
Dept: GENERAL RADIOLOGY | Age: 65
DRG: 870 | End: 2025-02-14
Payer: COMMERCIAL

## 2025-02-14 ENCOUNTER — APPOINTMENT (OUTPATIENT)
Dept: CT IMAGING | Age: 65
DRG: 870 | End: 2025-02-14
Payer: COMMERCIAL

## 2025-02-14 DIAGNOSIS — I42.9 CARDIOMYOPATHY, UNSPECIFIED TYPE (HCC): ICD-10-CM

## 2025-02-14 DIAGNOSIS — J69.0 ASPIRATION PNEUMONIA OF BOTH LOWER LOBES, UNSPECIFIED ASPIRATION PNEUMONIA TYPE (HCC): ICD-10-CM

## 2025-02-14 DIAGNOSIS — R11.2 NAUSEA AND VOMITING, UNSPECIFIED VOMITING TYPE: ICD-10-CM

## 2025-02-14 DIAGNOSIS — R41.82 ALTERED MENTAL STATUS, UNSPECIFIED ALTERED MENTAL STATUS TYPE: Primary | ICD-10-CM

## 2025-02-14 DIAGNOSIS — R65.20 SEPSIS WITH ACUTE RESPIRATORY FAILURE WITHOUT SEPTIC SHOCK, DUE TO UNSPECIFIED ORGANISM, UNSPECIFIED WHETHER HYPOXIA OR HYPERCAPNIA PRESENT (HCC): ICD-10-CM

## 2025-02-14 DIAGNOSIS — J96.00 SEPSIS WITH ACUTE RESPIRATORY FAILURE WITHOUT SEPTIC SHOCK, DUE TO UNSPECIFIED ORGANISM, UNSPECIFIED WHETHER HYPOXIA OR HYPERCAPNIA PRESENT (HCC): ICD-10-CM

## 2025-02-14 DIAGNOSIS — R06.02 SHORTNESS OF BREATH: ICD-10-CM

## 2025-02-14 DIAGNOSIS — R73.9 HYPERGLYCEMIA: ICD-10-CM

## 2025-02-14 DIAGNOSIS — A41.9 SEPSIS WITH ACUTE RESPIRATORY FAILURE WITHOUT SEPTIC SHOCK, DUE TO UNSPECIFIED ORGANISM, UNSPECIFIED WHETHER HYPOXIA OR HYPERCAPNIA PRESENT (HCC): ICD-10-CM

## 2025-02-14 PROBLEM — J96.01 ACUTE HYPOXEMIC RESPIRATORY FAILURE (HCC): Status: ACTIVE | Noted: 2025-02-14

## 2025-02-14 LAB
ALBUMIN SERPL-MCNC: 4.3 G/DL (ref 3.5–4.6)
ALP SERPL-CCNC: 96 U/L (ref 35–104)
ALT SERPL-CCNC: 23 U/L (ref 0–41)
AMMONIA PLAS-SCNC: 19 UMOL/L (ref 16–60)
ANION GAP SERPL CALCULATED.3IONS-SCNC: 13 MEQ/L (ref 9–15)
ANION GAP SERPL CALCULATED.3IONS-SCNC: 21 MEQ/L (ref 9–15)
ANION GAP SERPL CALCULATED.3IONS-SCNC: 21 MEQ/L (ref 9–15)
APTT PPP: 44.5 SEC (ref 24.4–36.8)
AST SERPL-CCNC: 39 U/L (ref 0–40)
B PARAP IS1001 DNA NPH QL NAA+NON-PROBE: NOT DETECTED
B PERT.PT PRMT NPH QL NAA+NON-PROBE: NOT DETECTED
B-OH-BUTYR SERPL-SCNC: 15.1 MG/DL (ref 0.2–2.8)
BACTERIA URNS QL MICRO: NEGATIVE /HPF
BASE EXCESS ARTERIAL: -3 (ref -3–3)
BASE EXCESS ARTERIAL: 2 (ref -3–3)
BASE EXCESS ARTERIAL: 5 (ref -3–3)
BASOPHILS # BLD: 0 K/UL (ref 0–0.2)
BASOPHILS NFR BLD: 0.3 %
BILIRUB SERPL-MCNC: 0.3 MG/DL (ref 0.2–0.7)
BILIRUB UR QL STRIP: NEGATIVE
BNP BLD-MCNC: 947 PG/ML
BUN SERPL-MCNC: 56 MG/DL (ref 8–23)
BUN SERPL-MCNC: 58 MG/DL (ref 8–23)
BUN SERPL-MCNC: 58 MG/DL (ref 8–23)
C PNEUM DNA NPH QL NAA+NON-PROBE: NOT DETECTED
CALCIUM IONIZED: 1.04 MMOL/L (ref 1.12–1.32)
CALCIUM IONIZED: 1.16 MMOL/L (ref 1.12–1.32)
CALCIUM IONIZED: 1.19 MMOL/L (ref 1.12–1.32)
CALCIUM SERPL-MCNC: 7 MG/DL (ref 8.5–9.9)
CALCIUM SERPL-MCNC: 8 MG/DL (ref 8.5–9.9)
CALCIUM SERPL-MCNC: 9 MG/DL (ref 8.5–9.9)
CHLORIDE SERPL-SCNC: 103 MEQ/L (ref 95–107)
CHLORIDE SERPL-SCNC: 103 MEQ/L (ref 95–107)
CHLORIDE SERPL-SCNC: 95 MEQ/L (ref 95–107)
CHP ED QC CHECK: YES
CLARITY UR: ABNORMAL
CO2 SERPL-SCNC: 13 MEQ/L (ref 20–31)
CO2 SERPL-SCNC: 19 MEQ/L (ref 20–31)
CO2 SERPL-SCNC: 23 MEQ/L (ref 20–31)
COLOR UR: YELLOW
CREAT SERPL-MCNC: 2.86 MG/DL (ref 0.7–1.2)
CREAT SERPL-MCNC: 3.07 MG/DL (ref 0.7–1.2)
CREAT SERPL-MCNC: 3.3 MG/DL (ref 0.7–1.2)
EKG ATRIAL RATE: 134 BPM
EKG P AXIS: 18 DEGREES
EKG P-R INTERVAL: 130 MS
EKG Q-T INTERVAL: 308 MS
EKG QRS DURATION: 120 MS
EKG QTC CALCULATION (BAZETT): 459 MS
EKG R AXIS: -40 DEGREES
EKG T AXIS: 21 DEGREES
EKG VENTRICULAR RATE: 134 BPM
EOSINOPHIL # BLD: 0 K/UL (ref 0–0.7)
EOSINOPHIL NFR BLD: 0 %
EPI CELLS #/AREA URNS AUTO: ABNORMAL /HPF (ref 0–5)
ERYTHROCYTE [DISTWIDTH] IN BLOOD BY AUTOMATED COUNT: 13.9 % (ref 11.5–14.5)
FLUAV H1 2009 PAN RNA NPH NAA+NON-PROBE: DETECTED
FLUBV RNA NPH QL NAA+NON-PROBE: NOT DETECTED
GLOBULIN SER CALC-MCNC: 3.8 G/DL (ref 2.3–3.5)
GLUCOSE BLD-MCNC: 106 MG/DL (ref 70–99)
GLUCOSE BLD-MCNC: 160 MG/DL (ref 70–99)
GLUCOSE BLD-MCNC: 186 MG/DL (ref 70–99)
GLUCOSE BLD-MCNC: 202 MG/DL (ref 70–99)
GLUCOSE BLD-MCNC: 222 MG/DL (ref 70–99)
GLUCOSE BLD-MCNC: 238 MG/DL (ref 70–99)
GLUCOSE BLD-MCNC: 317 MG/DL
GLUCOSE BLD-MCNC: 317 MG/DL (ref 70–99)
GLUCOSE BLD-MCNC: 320 MG/DL (ref 70–99)
GLUCOSE BLD-MCNC: 357 MG/DL (ref 70–99)
GLUCOSE BLD-MCNC: 63 MG/DL (ref 70–99)
GLUCOSE BLD-MCNC: 75 MG/DL (ref 70–99)
GLUCOSE SERPL-MCNC: 144 MG/DL (ref 70–99)
GLUCOSE SERPL-MCNC: 223 MG/DL (ref 70–99)
GLUCOSE SERPL-MCNC: 308 MG/DL (ref 70–99)
GLUCOSE UR STRIP-MCNC: >=1000 MG/DL
HADV DNA NPH QL NAA+NON-PROBE: NOT DETECTED
HCO3 ARTERIAL: 22.1 MMOL/L (ref 21–29)
HCO3 ARTERIAL: 29.1 MMOL/L (ref 21–29)
HCO3 ARTERIAL: 30.2 MMOL/L (ref 21–29)
HCOV 229E RNA NPH QL NAA+NON-PROBE: NOT DETECTED
HCOV HKU1 RNA NPH QL NAA+NON-PROBE: NOT DETECTED
HCOV NL63 RNA NPH QL NAA+NON-PROBE: NOT DETECTED
HCOV OC43 RNA NPH QL NAA+NON-PROBE: NOT DETECTED
HCT VFR BLD AUTO: 25 % (ref 41–53)
HCT VFR BLD AUTO: 28.6 % (ref 42–52)
HCT VFR BLD AUTO: 31 % (ref 41–53)
HCT VFR BLD AUTO: 32.5 % (ref 42–52)
HCT VFR BLD AUTO: 35 % (ref 41–53)
HCT VFR BLD AUTO: 37.7 % (ref 42–52)
HGB BLD CALC-MCNC: 10.4 GM/DL (ref 13.5–17.5)
HGB BLD CALC-MCNC: 12 GM/DL (ref 13.5–17.5)
HGB BLD CALC-MCNC: 8.5 GM/DL (ref 13.5–17.5)
HGB BLD-MCNC: 10.2 G/DL (ref 14–18)
HGB BLD-MCNC: 12.3 G/DL (ref 14–18)
HGB BLD-MCNC: 9.4 G/DL (ref 14–18)
HGB UR QL STRIP: ABNORMAL
HMPV RNA NPH QL NAA+NON-PROBE: NOT DETECTED
HPIV1 RNA NPH QL NAA+NON-PROBE: NOT DETECTED
HPIV2 RNA NPH QL NAA+NON-PROBE: NOT DETECTED
HPIV3 RNA NPH QL NAA+NON-PROBE: NOT DETECTED
HPIV4 RNA NPH QL NAA+NON-PROBE: NOT DETECTED
HYALINE CASTS #/AREA URNS LPF: ABNORMAL /LPF (ref 0–5)
INR PPP: 2.6
KETONES UR STRIP-MCNC: ABNORMAL MG/DL
LACTATE: 0.99 MMOL/L (ref 0.4–2)
LACTATE: 1.16 MMOL/L (ref 0.4–2)
LACTATE: 1.26 MMOL/L (ref 0.4–2)
LACTIC ACID, SEPSIS: 1.5 MMOL/L (ref 0.5–1.9)
LACTIC ACID, SEPSIS: 3.5 MMOL/L (ref 0.5–1.9)
LEUKOCYTE ESTERASE UR QL STRIP: NEGATIVE
LYMPHOCYTES # BLD: 1.1 K/UL (ref 1–4.8)
LYMPHOCYTES NFR BLD: 13.7 %
M PNEUMO DNA NPH QL NAA+NON-PROBE: NOT DETECTED
MAGNESIUM SERPL-MCNC: 2 MG/DL (ref 1.7–2.4)
MCH RBC QN AUTO: 31.1 PG (ref 27–31.3)
MCHC RBC AUTO-ENTMCNC: 32.6 % (ref 33–37)
MCV RBC AUTO: 95.4 FL (ref 79–92.2)
MONOCYTES # BLD: 0.5 K/UL (ref 0.2–0.8)
MONOCYTES NFR BLD: 6.8 %
NEUTROPHILS # BLD: 6.1 K/UL (ref 1.4–6.5)
NEUTS SEG NFR BLD: 78.3 %
NITRITE UR QL STRIP: NEGATIVE
O2 SAT, ARTERIAL: 77 % (ref 93–100)
O2 SAT, ARTERIAL: 94 % (ref 93–100)
O2 SAT, ARTERIAL: 99 % (ref 93–100)
PCO2 ARTERIAL: 37 MM HG (ref 35–45)
PCO2 ARTERIAL: 53 MM HG (ref 35–45)
PCO2 ARTERIAL: 66 MM HG (ref 35–45)
PERFORMED ON: ABNORMAL
PERFORMED ON: NORMAL
PH ARTERIAL: 7.25 (ref 7.35–7.45)
PH ARTERIAL: 7.37 (ref 7.35–7.45)
PH ARTERIAL: 7.38 (ref 7.35–7.45)
PH UR STRIP: 5.5 [PH] (ref 5–9)
PLATELET # BLD AUTO: 179 K/UL (ref 130–400)
PO2 ARTERIAL: 187 MM HG (ref 75–108)
PO2 ARTERIAL: 44 MM HG (ref 75–108)
PO2 ARTERIAL: 72 MM HG (ref 75–108)
POC CHLORIDE: 104 MEQ/L (ref 99–110)
POC CHLORIDE: 105 MEQ/L (ref 99–110)
POC CHLORIDE: 107 MEQ/L (ref 99–110)
POC CREATININE: 2.6 MG/DL (ref 0.8–1.3)
POC CREATININE: 3.1 MG/DL (ref 0.8–1.3)
POC CREATININE: 3.1 MG/DL (ref 0.8–1.3)
POC FIO2: 100
POC FIO2: 2
POC FIO2: 36
POC SAMPLE TYPE: ABNORMAL
POTASSIUM SERPL-SCNC: 4.6 MEQ/L (ref 3.5–5.1)
POTASSIUM SERPL-SCNC: 4.8 MEQ/L (ref 3.5–5.1)
POTASSIUM SERPL-SCNC: 5.5 MEQ/L (ref 3.5–5.1)
POTASSIUM SERPL-SCNC: 5.6 MEQ/L (ref 3.4–4.9)
POTASSIUM SERPL-SCNC: 5.7 MEQ/L (ref 3.4–4.9)
POTASSIUM SERPL-SCNC: 6.2 MEQ/L (ref 3.4–4.9)
PROCALCITONIN SERPL IA-MCNC: 3.6 NG/ML (ref 0–0.15)
PROT SERPL-MCNC: 8.1 G/DL (ref 6.3–8)
PROT UR STRIP-MCNC: 100 MG/DL
PROTHROMBIN TIME: 28.8 SEC (ref 12.3–14.9)
RBC # BLD AUTO: 3.95 M/UL (ref 4.7–6.1)
RBC #/AREA URNS AUTO: ABNORMAL /HPF (ref 0–5)
REASON FOR REJECTION: NORMAL
REJECTED TEST: NORMAL
RSV RNA NPH QL NAA+NON-PROBE: NOT DETECTED
RV+EV RNA NPH QL NAA+NON-PROBE: NOT DETECTED
SARS-COV-2 RNA NPH QL NAA+NON-PROBE: NOT DETECTED
SODIUM BLD-SCNC: 134 MEQ/L (ref 136–145)
SODIUM BLD-SCNC: 142 MEQ/L (ref 136–145)
SODIUM BLD-SCNC: 143 MEQ/L (ref 136–145)
SODIUM SERPL-SCNC: 135 MEQ/L (ref 135–144)
SODIUM SERPL-SCNC: 137 MEQ/L (ref 135–144)
SODIUM SERPL-SCNC: 139 MEQ/L (ref 135–144)
SP GR UR STRIP: 1.02 (ref 1–1.03)
TCO2 ARTERIAL: 23 MMOL/L (ref 21–32)
TCO2 ARTERIAL: 31 MMOL/L (ref 21–32)
TCO2 ARTERIAL: 32 MMOL/L (ref 21–32)
TROPONIN, HIGH SENSITIVITY: 169 NG/L (ref 0–19)
TROPONIN, HIGH SENSITIVITY: 174 NG/L (ref 0–19)
TROPONIN, HIGH SENSITIVITY: 178 NG/L (ref 0–19)
URINE REFLEX TO CULTURE: YES
UROBILINOGEN UR STRIP-ACNC: 0.2 E.U./DL
WBC # BLD AUTO: 7.8 K/UL (ref 4.8–10.8)
WBC #/AREA URNS AUTO: ABNORMAL /HPF (ref 0–5)

## 2025-02-14 PROCEDURE — 82803 BLOOD GASES ANY COMBINATION: CPT

## 2025-02-14 PROCEDURE — 82948 REAGENT STRIP/BLOOD GLUCOSE: CPT

## 2025-02-14 PROCEDURE — 96365 THER/PROPH/DIAG IV INF INIT: CPT

## 2025-02-14 PROCEDURE — 82435 ASSAY OF BLOOD CHLORIDE: CPT

## 2025-02-14 PROCEDURE — 31500 INSERT EMERGENCY AIRWAY: CPT

## 2025-02-14 PROCEDURE — 99285 EMERGENCY DEPT VISIT HI MDM: CPT

## 2025-02-14 PROCEDURE — 96375 TX/PRO/DX INJ NEW DRUG ADDON: CPT

## 2025-02-14 PROCEDURE — 6360000002 HC RX W HCPCS: Performed by: INTERNAL MEDICINE

## 2025-02-14 PROCEDURE — 80053 COMPREHEN METABOLIC PANEL: CPT

## 2025-02-14 PROCEDURE — 99222 1ST HOSP IP/OBS MODERATE 55: CPT | Performed by: SPECIALIST

## 2025-02-14 PROCEDURE — 2500000003 HC RX 250 WO HCPCS: Performed by: INTERNAL MEDICINE

## 2025-02-14 PROCEDURE — 36600 WITHDRAWAL OF ARTERIAL BLOOD: CPT

## 2025-02-14 PROCEDURE — 6370000000 HC RX 637 (ALT 250 FOR IP): Performed by: INTERNAL MEDICINE

## 2025-02-14 PROCEDURE — 84295 ASSAY OF SERUM SODIUM: CPT

## 2025-02-14 PROCEDURE — 85018 HEMOGLOBIN: CPT

## 2025-02-14 PROCEDURE — 96367 TX/PROPH/DG ADDL SEQ IV INF: CPT

## 2025-02-14 PROCEDURE — 82330 ASSAY OF CALCIUM: CPT

## 2025-02-14 PROCEDURE — 84145 PROCALCITONIN (PCT): CPT

## 2025-02-14 PROCEDURE — 85730 THROMBOPLASTIN TIME PARTIAL: CPT

## 2025-02-14 PROCEDURE — 82140 ASSAY OF AMMONIA: CPT

## 2025-02-14 PROCEDURE — 87086 URINE CULTURE/COLONY COUNT: CPT

## 2025-02-14 PROCEDURE — 71045 X-RAY EXAM CHEST 1 VIEW: CPT

## 2025-02-14 PROCEDURE — APPSS45 APP SPLIT SHARED TIME 31-45 MINUTES

## 2025-02-14 PROCEDURE — 0202U NFCT DS 22 TRGT SARS-COV-2: CPT

## 2025-02-14 PROCEDURE — 87040 BLOOD CULTURE FOR BACTERIA: CPT

## 2025-02-14 PROCEDURE — 36415 COLL VENOUS BLD VENIPUNCTURE: CPT

## 2025-02-14 PROCEDURE — 94002 VENT MGMT INPAT INIT DAY: CPT

## 2025-02-14 PROCEDURE — 36556 INSERT NON-TUNNEL CV CATH: CPT

## 2025-02-14 PROCEDURE — 83735 ASSAY OF MAGNESIUM: CPT

## 2025-02-14 PROCEDURE — 2000000000 HC ICU R&B

## 2025-02-14 PROCEDURE — 2500000003 HC RX 250 WO HCPCS

## 2025-02-14 PROCEDURE — 5A1955Z RESPIRATORY VENTILATION, GREATER THAN 96 CONSECUTIVE HOURS: ICD-10-PCS | Performed by: INTERNAL MEDICINE

## 2025-02-14 PROCEDURE — 87077 CULTURE AEROBIC IDENTIFY: CPT

## 2025-02-14 PROCEDURE — 96376 TX/PRO/DX INJ SAME DRUG ADON: CPT

## 2025-02-14 PROCEDURE — 76775 US EXAM ABDO BACK WALL LIM: CPT

## 2025-02-14 PROCEDURE — 0BH17EZ INSERTION OF ENDOTRACHEAL AIRWAY INTO TRACHEA, VIA NATURAL OR ARTIFICIAL OPENING: ICD-10-PCS | Performed by: INTERNAL MEDICINE

## 2025-02-14 PROCEDURE — 84484 ASSAY OF TROPONIN QUANT: CPT

## 2025-02-14 PROCEDURE — 82565 ASSAY OF CREATININE: CPT

## 2025-02-14 PROCEDURE — 02HV33Z INSERTION OF INFUSION DEVICE INTO SUPERIOR VENA CAVA, PERCUTANEOUS APPROACH: ICD-10-PCS | Performed by: INTERNAL MEDICINE

## 2025-02-14 PROCEDURE — 3E033XZ INTRODUCTION OF VASOPRESSOR INTO PERIPHERAL VEIN, PERCUTANEOUS APPROACH: ICD-10-PCS | Performed by: INTERNAL MEDICINE

## 2025-02-14 PROCEDURE — 6360000002 HC RX W HCPCS

## 2025-02-14 PROCEDURE — 83605 ASSAY OF LACTIC ACID: CPT

## 2025-02-14 PROCEDURE — 70450 CT HEAD/BRAIN W/O DYE: CPT

## 2025-02-14 PROCEDURE — 6370000000 HC RX 637 (ALT 250 FOR IP)

## 2025-02-14 PROCEDURE — 93005 ELECTROCARDIOGRAM TRACING: CPT

## 2025-02-14 PROCEDURE — 87186 SC STD MICRODIL/AGAR DIL: CPT

## 2025-02-14 PROCEDURE — 85025 COMPLETE CBC W/AUTO DIFF WBC: CPT

## 2025-02-14 PROCEDURE — 83880 ASSAY OF NATRIURETIC PEPTIDE: CPT

## 2025-02-14 PROCEDURE — 2580000003 HC RX 258

## 2025-02-14 PROCEDURE — 2580000003 HC RX 258: Performed by: INTERNAL MEDICINE

## 2025-02-14 PROCEDURE — 82010 KETONE BODYS QUAN: CPT

## 2025-02-14 PROCEDURE — 85014 HEMATOCRIT: CPT

## 2025-02-14 PROCEDURE — 85610 PROTHROMBIN TIME: CPT

## 2025-02-14 PROCEDURE — 81001 URINALYSIS AUTO W/SCOPE: CPT

## 2025-02-14 PROCEDURE — 84132 ASSAY OF SERUM POTASSIUM: CPT

## 2025-02-14 RX ORDER — 0.9 % SODIUM CHLORIDE 0.9 %
1000 INTRAVENOUS SOLUTION INTRAVENOUS ONCE
Status: COMPLETED | OUTPATIENT
Start: 2025-02-14 | End: 2025-02-14

## 2025-02-14 RX ORDER — ROCURONIUM BROMIDE 10 MG/ML
0.6 INJECTION, SOLUTION INTRAVENOUS ONCE
Status: COMPLETED | OUTPATIENT
Start: 2025-02-14 | End: 2025-02-14

## 2025-02-14 RX ORDER — OSELTAMIVIR PHOSPHATE 6 MG/ML
30 FOR SUSPENSION ORAL DAILY
Status: COMPLETED | OUTPATIENT
Start: 2025-02-14 | End: 2025-02-18

## 2025-02-14 RX ORDER — ACETAMINOPHEN 650 MG/1
650 SUPPOSITORY RECTAL ONCE
Status: COMPLETED | OUTPATIENT
Start: 2025-02-14 | End: 2025-02-14

## 2025-02-14 RX ORDER — DEXTROSE MONOHYDRATE 100 MG/ML
INJECTION, SOLUTION INTRAVENOUS CONTINUOUS PRN
Status: DISCONTINUED | OUTPATIENT
Start: 2025-02-14 | End: 2025-02-14 | Stop reason: SDUPTHER

## 2025-02-14 RX ORDER — FENTANYL CITRATE-0.9 % NACL/PF 10 MCG/ML
25-200 PLASTIC BAG, INJECTION (ML) INTRAVENOUS CONTINUOUS
Status: DISCONTINUED | OUTPATIENT
Start: 2025-02-14 | End: 2025-02-20 | Stop reason: HOSPADM

## 2025-02-14 RX ORDER — OCTREOTIDE ACETATE 100 UG/ML
100 INJECTION, SOLUTION INTRAVENOUS; SUBCUTANEOUS ONCE
Status: COMPLETED | OUTPATIENT
Start: 2025-02-14 | End: 2025-02-14

## 2025-02-14 RX ORDER — GLUCAGON 1 MG/ML
1 KIT INJECTION PRN
Status: DISCONTINUED | OUTPATIENT
Start: 2025-02-14 | End: 2025-02-15

## 2025-02-14 RX ORDER — GABAPENTIN 300 MG/1
300 CAPSULE ORAL 3 TIMES DAILY
Status: ON HOLD | COMMUNITY
End: 2025-02-16 | Stop reason: HOSPADM

## 2025-02-14 RX ORDER — SODIUM CHLORIDE 9 MG/ML
INJECTION, SOLUTION INTRAVENOUS PRN
Status: DISCONTINUED | OUTPATIENT
Start: 2025-02-14 | End: 2025-02-20 | Stop reason: HOSPADM

## 2025-02-14 RX ORDER — ACETAMINOPHEN 160 MG/5ML
650 LIQUID ORAL ONCE
Status: CANCELLED | OUTPATIENT
Start: 2025-02-14 | End: 2025-02-14

## 2025-02-14 RX ORDER — INSULIN LISPRO 100 [IU]/ML
0-8 INJECTION, SOLUTION INTRAVENOUS; SUBCUTANEOUS EVERY 4 HOURS
Status: DISCONTINUED | OUTPATIENT
Start: 2025-02-14 | End: 2025-02-14

## 2025-02-14 RX ORDER — NOREPINEPHRINE BITARTRATE 0.06 MG/ML
1-100 INJECTION, SOLUTION INTRAVENOUS CONTINUOUS
Status: DISCONTINUED | OUTPATIENT
Start: 2025-02-14 | End: 2025-02-20 | Stop reason: HOSPADM

## 2025-02-14 RX ORDER — VANCOMYCIN 2 G/400ML
25 INJECTION, SOLUTION INTRAVENOUS ONCE
Status: COMPLETED | OUTPATIENT
Start: 2025-02-14 | End: 2025-02-14

## 2025-02-14 RX ORDER — FENTANYL CITRATE 50 UG/ML
INJECTION, SOLUTION INTRAMUSCULAR; INTRAVENOUS
Status: DISCONTINUED
Start: 2025-02-14 | End: 2025-02-14 | Stop reason: WASHOUT

## 2025-02-14 RX ORDER — PROPOFOL 10 MG/ML
INJECTION, EMULSION INTRAVENOUS
Status: DISCONTINUED
Start: 2025-02-14 | End: 2025-02-14 | Stop reason: WASHOUT

## 2025-02-14 RX ORDER — ACETAMINOPHEN 325 MG/1
325 TABLET ORAL EVERY 4 HOURS PRN
Status: DISCONTINUED | OUTPATIENT
Start: 2025-02-14 | End: 2025-02-20 | Stop reason: HOSPADM

## 2025-02-14 RX ORDER — CALCIUM GLUCONATE 20 MG/ML
1000 INJECTION, SOLUTION INTRAVENOUS ONCE
Status: COMPLETED | OUTPATIENT
Start: 2025-02-14 | End: 2025-02-14

## 2025-02-14 RX ORDER — MIDAZOLAM HYDROCHLORIDE 1 MG/ML
5 INJECTION, SOLUTION INTRAMUSCULAR; INTRAVENOUS ONCE
Status: COMPLETED | OUTPATIENT
Start: 2025-02-14 | End: 2025-02-14

## 2025-02-14 RX ORDER — GENTAMICIN SULFATE 100 MG/100ML
100 INJECTION, SOLUTION INTRAVENOUS ONCE
Status: COMPLETED | OUTPATIENT
Start: 2025-02-14 | End: 2025-02-14

## 2025-02-14 RX ORDER — PROPOFOL 10 MG/ML
5-50 INJECTION, EMULSION INTRAVENOUS CONTINUOUS
Status: DISCONTINUED | OUTPATIENT
Start: 2025-02-14 | End: 2025-02-20 | Stop reason: HOSPADM

## 2025-02-14 RX ORDER — DEXTROSE MONOHYDRATE 100 MG/ML
INJECTION, SOLUTION INTRAVENOUS CONTINUOUS PRN
Status: DISCONTINUED | OUTPATIENT
Start: 2025-02-14 | End: 2025-02-15

## 2025-02-14 RX ORDER — DEXTROSE MONOHYDRATE 25 G/50ML
25 INJECTION, SOLUTION INTRAVENOUS ONCE
Status: COMPLETED | OUTPATIENT
Start: 2025-02-14 | End: 2025-02-14

## 2025-02-14 RX ORDER — PROPOFOL 10 MG/ML
INJECTION, EMULSION INTRAVENOUS
Status: COMPLETED
Start: 2025-02-14 | End: 2025-02-14

## 2025-02-14 RX ORDER — TACROLIMUS 1 MG/1
1 CAPSULE ORAL DAILY
Status: DISCONTINUED | OUTPATIENT
Start: 2025-02-14 | End: 2025-02-16

## 2025-02-14 RX ORDER — METOPROLOL TARTRATE 1 MG/ML
2.5 INJECTION, SOLUTION INTRAVENOUS EVERY 4 HOURS
Status: DISCONTINUED | OUTPATIENT
Start: 2025-02-14 | End: 2025-02-15

## 2025-02-14 RX ORDER — SODIUM ZIRCONIUM CYCLOSILICATE 10 G/10G
10 POWDER, FOR SUSPENSION ORAL DAILY
Status: ON HOLD | COMMUNITY
Start: 2025-01-13 | End: 2025-02-16 | Stop reason: HOSPADM

## 2025-02-14 RX ORDER — ROCURONIUM BROMIDE 10 MG/ML
INJECTION, SOLUTION INTRAVENOUS
Status: COMPLETED
Start: 2025-02-14 | End: 2025-02-14

## 2025-02-14 RX ORDER — MIDAZOLAM HYDROCHLORIDE 1 MG/ML
INJECTION, SOLUTION INTRAMUSCULAR; INTRAVENOUS
Status: COMPLETED
Start: 2025-02-14 | End: 2025-02-14

## 2025-02-14 RX ORDER — ETOMIDATE 2 MG/ML
INJECTION INTRAVENOUS
Status: COMPLETED
Start: 2025-02-14 | End: 2025-02-14

## 2025-02-14 RX ORDER — SUCCINYLCHOLINE/SOD CL,ISO/PF 100 MG/5ML
SYRINGE (ML) INTRAVENOUS
Status: DISCONTINUED
Start: 2025-02-14 | End: 2025-02-15

## 2025-02-14 RX ORDER — ONDANSETRON 2 MG/ML
4 INJECTION INTRAMUSCULAR; INTRAVENOUS ONCE
Status: COMPLETED | OUTPATIENT
Start: 2025-02-14 | End: 2025-02-14

## 2025-02-14 RX ORDER — GLUCAGON 1 MG/ML
1 KIT INJECTION PRN
Status: DISCONTINUED | OUTPATIENT
Start: 2025-02-14 | End: 2025-02-14 | Stop reason: SDUPTHER

## 2025-02-14 RX ORDER — 0.9 % SODIUM CHLORIDE 0.9 %
500 INTRAVENOUS SOLUTION INTRAVENOUS ONCE
Status: CANCELLED | OUTPATIENT
Start: 2025-02-14 | End: 2025-02-14

## 2025-02-14 RX ORDER — MIDODRINE HYDROCHLORIDE 10 MG/1
5 TABLET ORAL 3 TIMES DAILY
Status: ON HOLD | COMMUNITY
Start: 2025-01-13 | End: 2025-02-16 | Stop reason: HOSPADM

## 2025-02-14 RX ORDER — ETOMIDATE 2 MG/ML
20 INJECTION INTRAVENOUS ONCE
Status: COMPLETED | OUTPATIENT
Start: 2025-02-14 | End: 2025-02-14

## 2025-02-14 RX ORDER — ACETAMINOPHEN 500 MG
1000 TABLET ORAL ONCE
Status: COMPLETED | OUTPATIENT
Start: 2025-02-14 | End: 2025-02-14

## 2025-02-14 RX ADMIN — ETOMIDATE 20 MG: 2 INJECTION INTRAVENOUS at 20:55

## 2025-02-14 RX ADMIN — CALCIUM GLUCONATE 1000 MG: 98 INJECTION, SOLUTION INTRAVENOUS at 10:39

## 2025-02-14 RX ADMIN — SODIUM CHLORIDE 8 MG/HR: 9 INJECTION, SOLUTION INTRAVENOUS at 08:29

## 2025-02-14 RX ADMIN — OCTREOTIDE ACETATE 100 MCG: 100 INJECTION, SOLUTION INTRAVENOUS; SUBCUTANEOUS at 10:40

## 2025-02-14 RX ADMIN — SODIUM CHLORIDE 3.2 UNITS/HR: 9 INJECTION, SOLUTION INTRAVENOUS at 23:20

## 2025-02-14 RX ADMIN — SODIUM CHLORIDE 2.8 UNITS/HR: 9 INJECTION, SOLUTION INTRAVENOUS at 16:35

## 2025-02-14 RX ADMIN — PROPOFOL 20 MCG/KG/MIN: 10 INJECTION, EMULSION INTRAVENOUS at 21:12

## 2025-02-14 RX ADMIN — PIPERACILLIN AND TAZOBACTAM 3375 MG: 3; .375 INJECTION, POWDER, LYOPHILIZED, FOR SOLUTION INTRAVENOUS at 16:47

## 2025-02-14 RX ADMIN — ROCURONIUM BROMIDE 45 MG: 10 INJECTION INTRAVENOUS at 20:55

## 2025-02-14 RX ADMIN — ACETAMINOPHEN 650 MG: 650 SUPPOSITORY RECTAL at 17:08

## 2025-02-14 RX ADMIN — SODIUM CHLORIDE 1000 ML: 9 INJECTION, SOLUTION INTRAVENOUS at 18:16

## 2025-02-14 RX ADMIN — DEXTROSE MONOHYDRATE 25 G: 25 INJECTION, SOLUTION INTRAVENOUS at 20:49

## 2025-02-14 RX ADMIN — GENTAMICIN SULFATE 100 MG: 100 INJECTION, SOLUTION INTRAVENOUS at 23:02

## 2025-02-14 RX ADMIN — DEXTROSE MONOHYDRATE 125 ML: 100 INJECTION, SOLUTION INTRAVENOUS at 20:21

## 2025-02-14 RX ADMIN — SODIUM CHLORIDE 80 MG: 9 INJECTION INTRAMUSCULAR; INTRAVENOUS; SUBCUTANEOUS at 08:05

## 2025-02-14 RX ADMIN — MIDAZOLAM HYDROCHLORIDE 5 MG: 1 INJECTION, SOLUTION INTRAMUSCULAR; INTRAVENOUS at 20:53

## 2025-02-14 RX ADMIN — NOREPINEPHRINE BITARTRATE 5 MCG/MIN: 64 SOLUTION INTRAVENOUS at 22:18

## 2025-02-14 RX ADMIN — SODIUM CHLORIDE 1000 ML: 9 INJECTION, SOLUTION INTRAVENOUS at 10:33

## 2025-02-14 RX ADMIN — CALCIUM GLUCONATE 1000 MG: 20 INJECTION, SOLUTION INTRAVENOUS at 22:24

## 2025-02-14 RX ADMIN — SODIUM ZIRCONIUM CYCLOSILICATE 10 G: 10 POWDER, FOR SUSPENSION ORAL at 10:40

## 2025-02-14 RX ADMIN — TACROLIMUS 1 MG: 1 CAPSULE ORAL at 16:54

## 2025-02-14 RX ADMIN — SODIUM CHLORIDE, PRESERVATIVE FREE 40 MG: 5 INJECTION INTRAVENOUS at 23:30

## 2025-02-14 RX ADMIN — MEROPENEM 1000 MG: 1 INJECTION INTRAVENOUS at 23:23

## 2025-02-14 RX ADMIN — SODIUM ZIRCONIUM CYCLOSILICATE 10 G: 10 POWDER, FOR SUSPENSION ORAL at 16:54

## 2025-02-14 RX ADMIN — SODIUM BICARBONATE: 84 INJECTION, SOLUTION INTRAVENOUS at 16:08

## 2025-02-14 RX ADMIN — Medication 50 MCG/HR: at 23:29

## 2025-02-14 RX ADMIN — OSELTAMIVIR PHOSPHATE 30 MG: 6 POWDER, FOR SUSPENSION ORAL at 17:35

## 2025-02-14 RX ADMIN — PHYTONADIONE 10 MG: 10 INJECTION, EMULSION INTRAMUSCULAR; INTRAVENOUS; SUBCUTANEOUS at 17:24

## 2025-02-14 RX ADMIN — ACETAMINOPHEN 1000 MG: 500 TABLET ORAL at 14:17

## 2025-02-14 RX ADMIN — SODIUM BICARBONATE 100 MEQ: 84 INJECTION INTRAVENOUS at 20:47

## 2025-02-14 RX ADMIN — METOPROLOL TARTRATE 2.5 MG: 5 INJECTION INTRAVENOUS at 16:53

## 2025-02-14 RX ADMIN — ACETAMINOPHEN 650 MG: 650 SUPPOSITORY RECTAL at 07:45

## 2025-02-14 RX ADMIN — ONDANSETRON 4 MG: 2 INJECTION, SOLUTION INTRAMUSCULAR; INTRAVENOUS at 08:04

## 2025-02-14 RX ADMIN — SODIUM CHLORIDE 1000 ML: 9 INJECTION, SOLUTION INTRAVENOUS at 08:00

## 2025-02-14 RX ADMIN — ROCURONIUM BROMIDE 45 MG: 10 INJECTION, SOLUTION INTRAVENOUS at 20:55

## 2025-02-14 RX ADMIN — OCTREOTIDE ACETATE 25 MCG/HR: 500 INJECTION, SOLUTION INTRAVENOUS; SUBCUTANEOUS at 10:39

## 2025-02-14 RX ADMIN — DEXTROSE MONOHYDRATE 25 G: 25 INJECTION, SOLUTION INTRAVENOUS at 10:40

## 2025-02-14 RX ADMIN — PIPERACILLIN AND TAZOBACTAM 4500 MG: 4; .5 INJECTION, POWDER, LYOPHILIZED, FOR SOLUTION INTRAVENOUS at 07:51

## 2025-02-14 RX ADMIN — VANCOMYCIN 2000 MG: 2 INJECTION, SOLUTION INTRAVENOUS at 08:40

## 2025-02-14 RX ADMIN — INSULIN HUMAN 10 UNITS: 100 INJECTION, SOLUTION PARENTERAL at 10:40

## 2025-02-14 ASSESSMENT — PAIN SCALES - GENERAL
PAINLEVEL_OUTOF10: 2
PAINLEVEL_OUTOF10: 0
PAINLEVEL_OUTOF10: 8
PAINLEVEL_OUTOF10: 0
PAINLEVEL_OUTOF10: 3

## 2025-02-14 ASSESSMENT — PAIN DESCRIPTION - LOCATION
LOCATION: BACK
LOCATION: GENERALIZED
LOCATION: GENERALIZED

## 2025-02-14 ASSESSMENT — PAIN DESCRIPTION - DESCRIPTORS
DESCRIPTORS: DISCOMFORT

## 2025-02-14 ASSESSMENT — PAIN - FUNCTIONAL ASSESSMENT
PAIN_FUNCTIONAL_ASSESSMENT: NONE - DENIES PAIN
PAIN_FUNCTIONAL_ASSESSMENT: NONE - DENIES PAIN

## 2025-02-14 ASSESSMENT — PULMONARY FUNCTION TESTS
PIF_VALUE: 31
PIF_VALUE: 32

## 2025-02-14 ASSESSMENT — PAIN DESCRIPTION - ORIENTATION: ORIENTATION: LOWER

## 2025-02-14 NOTE — ED TRIAGE NOTES
Pt by ems from home after son stated pt began acting altered.  Unknown LWT;  on arrival  Pt oriented to name and place only; denies SOB (RA sats 84%- placed on 2L)

## 2025-02-14 NOTE — H&P
Hospital Medicine  History and Physical    Patient:  Oli Landaverde  MRN: [id number]    CHIEF COMPLAINT:    Chief Complaint   Patient presents with    Hyperglycemia       History Obtained From:  Patient, EMR  Primary Care Physician: Cornelio Barron MD    HISTORY OF PRESENT ILLNESS:   The patient is a 64 y.o. male with PMH of diabetes and liver transplantation.  Patient was brought to the emergency room with change in mental status.  Disoriented and confused.  Initial workup showed acute on chronic kidney failure, metabolic acidosis, elevated troponin, positive for influenza, elevated prolactin level and UTI.  Admitted patient to ICU.  Patient is awake but not alert.  Able to nod his head answering yes or no questions.  Able to follow simple commands such as moving extremity    Past Medical History:  No past medical history on file.    Past Surgical History:  No past surgical history on file.    Medications Prior to Admission:    Prior to Admission medications    Medication Sig Start Date End Date Taking? Authorizing Provider   midodrine (PROAMATINE) 10 MG tablet Take 0.5 tablets by mouth 3 times daily For 10 days 1/13/25  Yes Adryan Ruiz MD   LOKELMA 10 g PACK oral suspension Take 1 packet by mouth daily 1/13/25  Yes Adryan Ruiz MD   gabapentin (NEURONTIN) 300 MG capsule Take 1 capsule by mouth 3 times daily.   Yes Adryan Ruiz MD   warfarin (COUMADIN) 2.5 MG tablet Take 3.75mg Mon and 2.5 mg all other days of the week or as directed by [facility]. 1/13/22   Adryan Ruiz MD   thiamine 100 MG tablet 1 tablet daily 4/1/22   Adryan Ruiz MD   tacrolimus (PROGRAF) 1 MG capsule Take 1 capsule by mouth daily At 6 pm 5/31/22   Adryan Ruiz MD   pantoprazole (PROTONIX) 40 MG tablet Take 1 tablet by mouth in the morning and at bedtime 5/5/22   Adryan Ruiz MD   metoprolol tartrate (LOPRESSOR) 25 MG tablet Take 25 mg by mouth every 12 hours 3/31/22

## 2025-02-14 NOTE — ED NOTES
Spoke with the patients wife in regards to the patients status and what the plan of treatment is and what we have done.

## 2025-02-14 NOTE — ACP (ADVANCE CARE PLANNING)
Advance Care Planning   Healthcare Decision Maker:    Primary Decision Maker: Dina Landaverde - Spouse - [phone]    Secondary Decision Maker: Boris Landaverde - Child - [phone]    Click here to complete Healthcare Decision Makers including selection of the Healthcare Decision Maker Relationship (ie \"Primary\").          Info from spouse on phone.

## 2025-02-14 NOTE — ED PROVIDER NOTES
PATIENT REFERRED TO:  No follow-up provider specified.    DISCHARGE MEDICATIONS:  New Prescriptions    No medications on file     Controlled Substances Monitoring:          No data to display                (Please note that portions of this note were completed with a voice recognition program.  Efforts were made to edit the dictations but occasionally words are mis-transcribed.)    ROMY Montaño (electronically signed)  Attending Emergency Physician  Supervising Physician Priya Farmer PA  02/14/25 0950

## 2025-02-15 ENCOUNTER — APPOINTMENT (OUTPATIENT)
Age: 65
DRG: 870 | End: 2025-02-15
Attending: INTERNAL MEDICINE
Payer: COMMERCIAL

## 2025-02-15 ENCOUNTER — APPOINTMENT (OUTPATIENT)
Dept: CT IMAGING | Age: 65
DRG: 870 | End: 2025-02-15
Payer: COMMERCIAL

## 2025-02-15 PROBLEM — J11.00 INFLUENZA AND PNEUMONIA: Status: ACTIVE | Noted: 2025-02-15

## 2025-02-15 PROBLEM — I42.9 CARDIOMYOPATHY (HCC): Status: ACTIVE | Noted: 2025-02-15

## 2025-02-15 LAB
ABO/RH: NORMAL
ALBUMIN SERPL-MCNC: 2.7 G/DL (ref 3.5–4.6)
ALBUMIN SERPL-MCNC: 2.8 G/DL (ref 3.5–4.6)
ALBUMIN SERPL-MCNC: 2.8 G/DL (ref 3.5–4.6)
ALP SERPL-CCNC: 62 U/L (ref 35–104)
ALT SERPL-CCNC: 30 U/L (ref 0–41)
AMMONIA PLAS-SCNC: 32 UMOL/L (ref 16–60)
ANION GAP SERPL CALCULATED.3IONS-SCNC: 14 MEQ/L (ref 9–15)
ANION GAP SERPL CALCULATED.3IONS-SCNC: 17 MEQ/L (ref 9–15)
ANION GAP SERPL CALCULATED.3IONS-SCNC: 17 MEQ/L (ref 9–15)
ANISOCYTOSIS BLD QL SMEAR: ABNORMAL
ANTI-XA UNFRAC HEPARIN: 0.31 IU/ML
ANTI-XA UNFRAC HEPARIN: <0.1 IU/ML
ANTIBODY SCREEN: NORMAL
APTT PPP: 43.2 SEC (ref 24.4–36.8)
AST SERPL-CCNC: 72 U/L (ref 0–40)
BASE EXCESS ARTERIAL: 2 (ref -3–3)
BASOPHILS # BLD: 0 K/UL (ref 0–0.2)
BASOPHILS NFR BLD: 0.3 %
BILIRUB SERPL-MCNC: 0.3 MG/DL (ref 0.2–0.7)
BLOOD BANK DISPENSE STATUS: NORMAL
BLOOD BANK PRODUCT CODE: NORMAL
BPU ID: NORMAL
BUN SERPL-MCNC: 57 MG/DL (ref 8–23)
BUN SERPL-MCNC: 58 MG/DL (ref 8–23)
BUN SERPL-MCNC: 58 MG/DL (ref 8–23)
BURR CELLS: ABNORMAL
CALCIUM IONIZED: 1 MMOL/L (ref 1.12–1.32)
CALCIUM SERPL-MCNC: 6.7 MG/DL (ref 8.5–9.9)
CALCIUM SERPL-MCNC: 6.7 MG/DL (ref 8.5–9.9)
CALCIUM SERPL-MCNC: 7.2 MG/DL (ref 8.5–9.9)
CHLORIDE SERPL-SCNC: 105 MEQ/L (ref 95–107)
CO2 SERPL-SCNC: 21 MEQ/L (ref 20–31)
CO2 SERPL-SCNC: 23 MEQ/L (ref 20–31)
CO2 SERPL-SCNC: 24 MEQ/L (ref 20–31)
CREAT SERPL-MCNC: 3.51 MG/DL (ref 0.7–1.2)
CREAT SERPL-MCNC: 3.54 MG/DL (ref 0.7–1.2)
CREAT SERPL-MCNC: 3.68 MG/DL (ref 0.7–1.2)
DESCRIPTION BLOOD BANK: NORMAL
DOHLE BOD BLD QL SMEAR: ABNORMAL
ECHO AV AREA PEAK VELOCITY: 1.4 CM2
ECHO AV AREA PEAK VELOCITY: 1.4 CM2
ECHO AV AREA PEAK VELOCITY: 1.6 CM2
ECHO AV AREA PEAK VELOCITY: 1.6 CM2
ECHO AV AREA VTI: 1.2 CM2
ECHO AV AREA/BSA VTI: 0.6 CM2/M2
ECHO AV CUSP MM: 1.5 CM
ECHO AV MEAN GRADIENT: 7 MMHG
ECHO AV MEAN VELOCITY: 1.2 M/S
ECHO AV PEAK GRADIENT: 16 MMHG
ECHO AV PEAK GRADIENT: 19 MMHG
ECHO AV PEAK VELOCITY: 2.4 M/S
ECHO AV PEAK VELOCITY: 2.8 M/S
ECHO AV VTI: 55.4 CM
ECHO BSA: 1.89 M2
ECHO EST RA PRESSURE: 3 MMHG
ECHO LA DIAMETER INDEX: 1.66 CM/M2
ECHO LA DIAMETER: 3.1 CM
ECHO LA VOL A-L A2C: 33 ML (ref 18–58)
ECHO LA VOL A-L A4C: 77 ML (ref 18–58)
ECHO LA VOL MOD A2C: 33 ML (ref 18–58)
ECHO LA VOL MOD A4C: 73 ML (ref 18–58)
ECHO LA VOLUME AREA LENGTH: 62 ML
ECHO LA VOLUME INDEX A-L A2C: 18 ML/M2 (ref 16–34)
ECHO LA VOLUME INDEX A-L A4C: 41 ML/M2 (ref 16–34)
ECHO LA VOLUME INDEX AREA LENGTH: 33 ML/M2 (ref 16–34)
ECHO LA VOLUME INDEX MOD A2C: 18 ML/M2 (ref 16–34)
ECHO LA VOLUME INDEX MOD A4C: 39 ML/M2 (ref 16–34)
ECHO LV E' LATERAL VELOCITY: 3.95 CM/S
ECHO LV E' SEPTAL VELOCITY: 5.47 CM/S
ECHO LV EDV A2C: 51 ML
ECHO LV EDV A4C: 44 ML
ECHO LV EDV BP: 48 ML (ref 67–155)
ECHO LV EDV INDEX A4C: 24 ML/M2
ECHO LV EDV INDEX BP: 26 ML/M2
ECHO LV EDV NDEX A2C: 27 ML/M2
ECHO LV EJECTION FRACTION A2C: 70 %
ECHO LV EJECTION FRACTION A4C: 70 %
ECHO LV EJECTION FRACTION BIPLANE: 70 % (ref 55–100)
ECHO LV ESV A2C: 15 ML
ECHO LV ESV A4C: 13 ML
ECHO LV ESV BP: 15 ML (ref 22–58)
ECHO LV ESV INDEX A2C: 8 ML/M2
ECHO LV ESV INDEX A4C: 7 ML/M2
ECHO LV ESV INDEX BP: 8 ML/M2
ECHO LV FRACTIONAL SHORTENING: 33 % (ref 28–44)
ECHO LV INTERNAL DIMENSION DIASTOLE INDEX: 2.3 CM/M2
ECHO LV INTERNAL DIMENSION DIASTOLIC: 4.3 CM (ref 4.2–5.9)
ECHO LV INTERNAL DIMENSION SYSTOLIC INDEX: 1.55 CM/M2
ECHO LV INTERNAL DIMENSION SYSTOLIC: 2.9 CM
ECHO LV IVSD: 1.4 CM (ref 0.6–1)
ECHO LV IVSS: 1.8 CM
ECHO LV MASS 2D: 196.1 G (ref 88–224)
ECHO LV MASS INDEX 2D: 104.8 G/M2 (ref 49–115)
ECHO LV POSTERIOR WALL DIASTOLIC: 1.1 CM (ref 0.6–1)
ECHO LV POSTERIOR WALL SYSTOLIC: 1.9 CM
ECHO LV RELATIVE WALL THICKNESS RATIO: 0.51
ECHO LVOT AREA: 3.1 CM2
ECHO LVOT AV VTI INDEX: 0.38
ECHO LVOT DIAM: 2 CM
ECHO LVOT MEAN GRADIENT: 3 MMHG
ECHO LVOT PEAK GRADIENT: 6 MMHG
ECHO LVOT PEAK GRADIENT: 7 MMHG
ECHO LVOT PEAK VELOCITY: 1.3 M/S
ECHO LVOT PEAK VELOCITY: 1.3 M/S
ECHO LVOT STROKE VOLUME INDEX: 35.4 ML/M2
ECHO LVOT SV: 66.3 ML
ECHO LVOT VTI: 21.1 CM
ECHO MV A VELOCITY: 0.9 M/S
ECHO MV AREA VTI: 2.3 CM2
ECHO MV E DECELERATION TIME (DT): 211.4 MS
ECHO MV E VELOCITY: 0.77 M/S
ECHO MV E/A RATIO: 0.86
ECHO MV E/E' LATERAL: 19.49
ECHO MV E/E' RATIO (AVERAGED): 16.79
ECHO MV E/E' SEPTAL: 14.08
ECHO MV LVOT VTI INDEX: 1.35
ECHO MV MAX VELOCITY: 1.2 M/S
ECHO MV MEAN GRADIENT: 3 MMHG
ECHO MV MEAN VELOCITY: 0.8 M/S
ECHO MV PEAK GRADIENT: 6 MMHG
ECHO MV VTI: 28.4 CM
ECHO PV MAX VELOCITY: 1 M/S
ECHO PV PEAK GRADIENT: 4 MMHG
ECHO RIGHT VENTRICULAR SYSTOLIC PRESSURE (RVSP): 22 MMHG
ECHO RV INTERNAL DIMENSION: 3.4 CM
ECHO RV TAPSE: 1.9 CM (ref 1.7–?)
ECHO TV REGURGITANT MAX VELOCITY: 2.19 M/S
ECHO TV REGURGITANT PEAK GRADIENT: 19 MMHG
EOSINOPHIL # BLD: 0 K/UL (ref 0–0.7)
EOSINOPHIL NFR BLD: 0 %
ERYTHROCYTE [DISTWIDTH] IN BLOOD BY AUTOMATED COUNT: 14.7 % (ref 11.5–14.5)
ESTIMATED AVERAGE GLUCOSE: 126 MG/DL
GLOBULIN SER CALC-MCNC: 2.8 G/DL (ref 2.3–3.5)
GLUCOSE BLD-MCNC: 102 MG/DL (ref 70–99)
GLUCOSE BLD-MCNC: 120 MG/DL (ref 70–99)
GLUCOSE BLD-MCNC: 121 MG/DL (ref 70–99)
GLUCOSE BLD-MCNC: 146 MG/DL (ref 70–99)
GLUCOSE BLD-MCNC: 188 MG/DL (ref 70–99)
GLUCOSE BLD-MCNC: 200 MG/DL (ref 70–99)
GLUCOSE BLD-MCNC: 238 MG/DL (ref 70–99)
GLUCOSE BLD-MCNC: 249 MG/DL (ref 70–99)
GLUCOSE BLD-MCNC: 42 MG/DL (ref 70–99)
GLUCOSE BLD-MCNC: 55 MG/DL (ref 70–99)
GLUCOSE SERPL-MCNC: 126 MG/DL (ref 70–99)
GLUCOSE SERPL-MCNC: 142 MG/DL (ref 70–99)
GLUCOSE SERPL-MCNC: 93 MG/DL (ref 70–99)
HBA1C MFR BLD: 6 % (ref 4–6)
HCO3 ARTERIAL: 26.7 MMOL/L (ref 21–29)
HCT VFR BLD AUTO: 32 % (ref 41–53)
HCT VFR BLD AUTO: 32.8 % (ref 42–52)
HGB BLD CALC-MCNC: 10.8 GM/DL (ref 13.5–17.5)
HGB BLD-MCNC: 10.8 G/DL (ref 14–18)
INR PPP: 2.9
LACTATE: 1.23 MMOL/L (ref 0.4–2)
LYMPHOCYTES # BLD: 5.6 K/UL (ref 1–4.8)
LYMPHOCYTES NFR BLD: 48 %
MCH RBC QN AUTO: 31 PG (ref 27–31.3)
MCHC RBC AUTO-ENTMCNC: 32.9 % (ref 33–37)
MCV RBC AUTO: 94.3 FL (ref 79–92.2)
METAMYELOCYTES NFR BLD MANUAL: 6 %
MONOCYTES # BLD: 0 K/UL (ref 0.2–0.8)
MONOCYTES NFR BLD: 4.3 %
MYELOCYTES NFR BLD MANUAL: 1 %
NEUTROPHILS # BLD: 6.1 K/UL (ref 1.4–6.5)
NEUTS BAND NFR BLD MANUAL: 19 %
NEUTS SEG NFR BLD: 27 %
O2 SAT, ARTERIAL: 93 % (ref 93–100)
PATH INTERP BLD-IMP: YES
PCO2 ARTERIAL: 46 MM HG (ref 35–45)
PERFORMED ON: ABNORMAL
PH ARTERIAL: 7.37 (ref 7.35–7.45)
PHOSPHATE SERPL-MCNC: 3.7 MG/DL (ref 2.3–4.8)
PHOSPHATE SERPL-MCNC: 4 MG/DL (ref 2.3–4.8)
PLATELET # BLD AUTO: 139 K/UL (ref 130–400)
PLATELET BLD QL SMEAR: ADEQUATE
PO2 ARTERIAL: 70 MM HG (ref 75–108)
POC CHLORIDE: 107 MEQ/L (ref 99–110)
POC CREATININE: 3.1 MG/DL (ref 0.8–1.3)
POC FIO2: 60
POC SAMPLE TYPE: ABNORMAL
POIKILOCYTOSIS BLD QL SMEAR: ABNORMAL
POTASSIUM SERPL-SCNC: 4 MEQ/L (ref 3.5–5.1)
POTASSIUM SERPL-SCNC: 4.3 MEQ/L (ref 3.4–4.9)
POTASSIUM SERPL-SCNC: 4.4 MEQ/L (ref 3.4–4.9)
POTASSIUM SERPL-SCNC: 4.6 MEQ/L (ref 3.4–4.9)
PROCALCITONIN SERPL IA-MCNC: 49.24 NG/ML (ref 0–0.15)
PROT SERPL-MCNC: 5.5 G/DL (ref 6.3–8)
PROTHROMBIN TIME: 32 SEC (ref 12.3–14.9)
RBC # BLD AUTO: 3.48 M/UL (ref 4.7–6.1)
REASON FOR REJECTION: NORMAL
REJECTED TEST: NORMAL
SODIUM BLD-SCNC: 144 MEQ/L (ref 136–145)
SODIUM SERPL-SCNC: 143 MEQ/L (ref 135–144)
SODIUM SERPL-SCNC: 143 MEQ/L (ref 135–144)
SODIUM SERPL-SCNC: 145 MEQ/L (ref 135–144)
TCO2 ARTERIAL: 28 MMOL/L (ref 21–32)
VANCOMYCIN TROUGH SERPL-MCNC: 21.2 UG/ML (ref 10–20)
WBC # BLD AUTO: 11.6 K/UL (ref 4.8–10.8)

## 2025-02-15 PROCEDURE — 74176 CT ABD & PELVIS W/O CONTRAST: CPT

## 2025-02-15 PROCEDURE — 83036 HEMOGLOBIN GLYCOSYLATED A1C: CPT

## 2025-02-15 PROCEDURE — 2000000000 HC ICU R&B

## 2025-02-15 PROCEDURE — 6360000002 HC RX W HCPCS: Performed by: INTERNAL MEDICINE

## 2025-02-15 PROCEDURE — 86901 BLOOD TYPING SEROLOGIC RH(D): CPT

## 2025-02-15 PROCEDURE — 2500000003 HC RX 250 WO HCPCS: Performed by: INTERNAL MEDICINE

## 2025-02-15 PROCEDURE — 36430 TRANSFUSION BLD/BLD COMPNT: CPT

## 2025-02-15 PROCEDURE — 99291 CRITICAL CARE FIRST HOUR: CPT | Performed by: INTERNAL MEDICINE

## 2025-02-15 PROCEDURE — 87077 CULTURE AEROBIC IDENTIFY: CPT

## 2025-02-15 PROCEDURE — 36600 WITHDRAWAL OF ARTERIAL BLOOD: CPT

## 2025-02-15 PROCEDURE — 36592 COLLECT BLOOD FROM PICC: CPT

## 2025-02-15 PROCEDURE — 93306 TTE W/DOPPLER COMPLETE: CPT

## 2025-02-15 PROCEDURE — 2580000003 HC RX 258: Performed by: INTERNAL MEDICINE

## 2025-02-15 PROCEDURE — 94003 VENT MGMT INPAT SUBQ DAY: CPT

## 2025-02-15 PROCEDURE — 85520 HEPARIN ASSAY: CPT

## 2025-02-15 PROCEDURE — 87186 SC STD MICRODIL/AGAR DIL: CPT

## 2025-02-15 PROCEDURE — 2580000003 HC RX 258

## 2025-02-15 PROCEDURE — 6370000000 HC RX 637 (ALT 250 FOR IP): Performed by: INTERNAL MEDICINE

## 2025-02-15 PROCEDURE — 99222 1ST HOSP IP/OBS MODERATE 55: CPT | Performed by: INTERNAL MEDICINE

## 2025-02-15 PROCEDURE — 80202 ASSAY OF VANCOMYCIN: CPT

## 2025-02-15 PROCEDURE — 84132 ASSAY OF SERUM POTASSIUM: CPT

## 2025-02-15 PROCEDURE — 84295 ASSAY OF SERUM SODIUM: CPT

## 2025-02-15 PROCEDURE — 85025 COMPLETE CBC W/AUTO DIFF WBC: CPT

## 2025-02-15 PROCEDURE — 99232 SBSQ HOSP IP/OBS MODERATE 35: CPT | Performed by: SPECIALIST

## 2025-02-15 PROCEDURE — 86850 RBC ANTIBODY SCREEN: CPT

## 2025-02-15 PROCEDURE — 83605 ASSAY OF LACTIC ACID: CPT

## 2025-02-15 PROCEDURE — 85014 HEMATOCRIT: CPT

## 2025-02-15 PROCEDURE — 85610 PROTHROMBIN TIME: CPT

## 2025-02-15 PROCEDURE — 82140 ASSAY OF AMMONIA: CPT

## 2025-02-15 PROCEDURE — 82803 BLOOD GASES ANY COMBINATION: CPT

## 2025-02-15 PROCEDURE — 80053 COMPREHEN METABOLIC PANEL: CPT

## 2025-02-15 PROCEDURE — 87070 CULTURE OTHR SPECIMN AEROBIC: CPT

## 2025-02-15 PROCEDURE — 84145 PROCALCITONIN (PCT): CPT

## 2025-02-15 PROCEDURE — 82330 ASSAY OF CALCIUM: CPT

## 2025-02-15 PROCEDURE — 93306 TTE W/DOPPLER COMPLETE: CPT | Performed by: INTERNAL MEDICINE

## 2025-02-15 PROCEDURE — 85730 THROMBOPLASTIN TIME PARTIAL: CPT

## 2025-02-15 PROCEDURE — 30233N1 TRANSFUSION OF NONAUTOLOGOUS RED BLOOD CELLS INTO PERIPHERAL VEIN, PERCUTANEOUS APPROACH: ICD-10-PCS | Performed by: INTERNAL MEDICINE

## 2025-02-15 PROCEDURE — 36415 COLL VENOUS BLD VENIPUNCTURE: CPT

## 2025-02-15 PROCEDURE — 86923 COMPATIBILITY TEST ELECTRIC: CPT

## 2025-02-15 PROCEDURE — 82948 REAGENT STRIP/BLOOD GLUCOSE: CPT

## 2025-02-15 PROCEDURE — 71250 CT THORAX DX C-: CPT

## 2025-02-15 PROCEDURE — 82435 ASSAY OF BLOOD CHLORIDE: CPT

## 2025-02-15 PROCEDURE — 99232 SBSQ HOSP IP/OBS MODERATE 35: CPT | Performed by: INTERNAL MEDICINE

## 2025-02-15 PROCEDURE — 86900 BLOOD TYPING SEROLOGIC ABO: CPT

## 2025-02-15 PROCEDURE — 82565 ASSAY OF CREATININE: CPT

## 2025-02-15 PROCEDURE — 2700000000 HC OXYGEN THERAPY PER DAY

## 2025-02-15 PROCEDURE — P9016 RBC LEUKOCYTES REDUCED: HCPCS

## 2025-02-15 RX ORDER — SODIUM CHLORIDE 9 MG/ML
INJECTION, SOLUTION INTRAVENOUS
Status: COMPLETED
Start: 2025-02-15 | End: 2025-02-15

## 2025-02-15 RX ORDER — HEPARIN SODIUM 10000 [USP'U]/100ML
5-30 INJECTION, SOLUTION INTRAVENOUS CONTINUOUS
Status: DISCONTINUED | OUTPATIENT
Start: 2025-02-15 | End: 2025-02-20 | Stop reason: HOSPADM

## 2025-02-15 RX ORDER — GLUCAGON 1 MG/ML
1 KIT INJECTION PRN
Status: DISCONTINUED | OUTPATIENT
Start: 2025-02-15 | End: 2025-02-20 | Stop reason: HOSPADM

## 2025-02-15 RX ORDER — HYDROCORTISONE SODIUM SUCCINATE 100 MG/2ML
50 INJECTION INTRAMUSCULAR; INTRAVENOUS EVERY 8 HOURS
Status: DISCONTINUED | OUTPATIENT
Start: 2025-02-15 | End: 2025-02-17

## 2025-02-15 RX ORDER — DEXTROSE MONOHYDRATE 100 MG/ML
INJECTION, SOLUTION INTRAVENOUS CONTINUOUS PRN
Status: DISCONTINUED | OUTPATIENT
Start: 2025-02-15 | End: 2025-02-20 | Stop reason: HOSPADM

## 2025-02-15 RX ORDER — SODIUM CHLORIDE 9 MG/ML
INJECTION, SOLUTION INTRAVENOUS CONTINUOUS
Status: DISCONTINUED | OUTPATIENT
Start: 2025-02-15 | End: 2025-02-16

## 2025-02-15 RX ORDER — HEPARIN SODIUM 1000 [USP'U]/ML
40 INJECTION, SOLUTION INTRAVENOUS; SUBCUTANEOUS PRN
Status: DISCONTINUED | OUTPATIENT
Start: 2025-02-15 | End: 2025-02-20 | Stop reason: HOSPADM

## 2025-02-15 RX ORDER — DEXTROSE MONOHYDRATE 25 G/50ML
25 INJECTION, SOLUTION INTRAVENOUS ONCE
Status: COMPLETED | OUTPATIENT
Start: 2025-02-15 | End: 2025-02-15

## 2025-02-15 RX ORDER — HEPARIN SODIUM 1000 [USP'U]/ML
80 INJECTION, SOLUTION INTRAVENOUS; SUBCUTANEOUS PRN
Status: DISCONTINUED | OUTPATIENT
Start: 2025-02-15 | End: 2025-02-20 | Stop reason: HOSPADM

## 2025-02-15 RX ORDER — CHLORHEXIDINE GLUCONATE ORAL RINSE 1.2 MG/ML
15 SOLUTION DENTAL 2 TIMES DAILY
Status: DISCONTINUED | OUTPATIENT
Start: 2025-02-15 | End: 2025-02-20 | Stop reason: HOSPADM

## 2025-02-15 RX ORDER — FLUDROCORTISONE ACETATE 0.1 MG/1
0.1 TABLET ORAL DAILY
Status: DISCONTINUED | OUTPATIENT
Start: 2025-02-15 | End: 2025-02-20 | Stop reason: HOSPADM

## 2025-02-15 RX ORDER — INSULIN LISPRO 100 [IU]/ML
0-16 INJECTION, SOLUTION INTRAVENOUS; SUBCUTANEOUS EVERY 6 HOURS SCHEDULED
Status: DISCONTINUED | OUTPATIENT
Start: 2025-02-15 | End: 2025-02-16

## 2025-02-15 RX ADMIN — NOREPINEPHRINE BITARTRATE 16 MCG/MIN: 64 SOLUTION INTRAVENOUS at 16:34

## 2025-02-15 RX ADMIN — SODIUM BICARBONATE: 84 INJECTION, SOLUTION INTRAVENOUS at 00:36

## 2025-02-15 RX ADMIN — MEROPENEM 500 MG: 500 INJECTION INTRAVENOUS at 19:18

## 2025-02-15 RX ADMIN — HYDROCORTISONE SODIUM SUCCINATE 50 MG: 100 INJECTION INTRAMUSCULAR; INTRAVENOUS at 09:25

## 2025-02-15 RX ADMIN — DEXTROSE MONOHYDRATE 25 G: 25 INJECTION, SOLUTION INTRAVENOUS at 10:19

## 2025-02-15 RX ADMIN — ACETAMINOPHEN 325 MG: 325 TABLET ORAL at 11:35

## 2025-02-15 RX ADMIN — DEXTROSE MONOHYDRATE 125 ML: 100 INJECTION, SOLUTION INTRAVENOUS at 10:06

## 2025-02-15 RX ADMIN — CHLORHEXIDINE GLUCONATE, 0.12% ORAL RINSE 15 ML: 1.2 SOLUTION DENTAL at 19:16

## 2025-02-15 RX ADMIN — SODIUM CHLORIDE, PRESERVATIVE FREE 40 MG: 5 INJECTION INTRAVENOUS at 09:25

## 2025-02-15 RX ADMIN — SODIUM CHLORIDE, PRESERVATIVE FREE 40 MG: 5 INJECTION INTRAVENOUS at 21:39

## 2025-02-15 RX ADMIN — OCTREOTIDE ACETATE 25 MCG/HR: 500 INJECTION, SOLUTION INTRAVENOUS; SUBCUTANEOUS at 07:01

## 2025-02-15 RX ADMIN — HEPARIN SODIUM 18 UNITS/KG/HR: 10000 INJECTION, SOLUTION INTRAVENOUS at 13:12

## 2025-02-15 RX ADMIN — OSELTAMIVIR PHOSPHATE 30 MG: 6 POWDER, FOR SUSPENSION ORAL at 09:34

## 2025-02-15 RX ADMIN — Medication 100 MCG/HR: at 09:46

## 2025-02-15 RX ADMIN — HYDROCORTISONE SODIUM SUCCINATE 50 MG: 100 INJECTION INTRAMUSCULAR; INTRAVENOUS at 16:29

## 2025-02-15 RX ADMIN — MEROPENEM 500 MG: 500 INJECTION INTRAVENOUS at 09:25

## 2025-02-15 RX ADMIN — FLUDROCORTISONE ACETATE 0.1 MG: 0.1 TABLET ORAL at 09:26

## 2025-02-15 RX ADMIN — SODIUM BICARBONATE: 84 INJECTION, SOLUTION INTRAVENOUS at 06:42

## 2025-02-15 RX ADMIN — DEXTROSE MONOHYDRATE 125 ML: 100 INJECTION, SOLUTION INTRAVENOUS at 06:46

## 2025-02-15 RX ADMIN — Medication 100 MCG/HR: at 19:14

## 2025-02-15 RX ADMIN — TACROLIMUS 1 MG: 1 CAPSULE ORAL at 09:26

## 2025-02-15 RX ADMIN — SODIUM CHLORIDE: 9 INJECTION, SOLUTION INTRAVENOUS at 04:23

## 2025-02-15 RX ADMIN — CHLORHEXIDINE GLUCONATE, 0.12% ORAL RINSE 15 ML: 1.2 SOLUTION DENTAL at 09:26

## 2025-02-15 RX ADMIN — SODIUM CHLORIDE: 9 INJECTION, SOLUTION INTRAVENOUS at 09:21

## 2025-02-15 ASSESSMENT — PAIN SCALES - GENERAL
PAINLEVEL_OUTOF10: 0
PAINLEVEL_OUTOF10: 0

## 2025-02-15 ASSESSMENT — PULMONARY FUNCTION TESTS
PIF_VALUE: 33
PIF_VALUE: 28
PIF_VALUE: 28
PIF_VALUE: 26

## 2025-02-15 NOTE — PROCEDURES
PROCEDURE NOTE  Date: 2/14/2025   Name: Oli Landaverde  YOB: 1960    Procedures          Procedure Note      Procedure:  Insertion of TLC via  Right internal jugular vein    Indications:  Hemodynamic/CVP monitoring and IV access    Anesthesia:  Sedated.    Consent:  Informed written obtained.. By wife    Technique:  Procedure was done using strict aseptic technique. The patient's neck was prepped and draped in the usual sterile fashion.  Ultrasound was used to identify the vein. This area of the neck overlying the internal jugular vein was injected with 1% lidocaine. Then using the Seldinger technique, a large-bore needle was placed into the internal jugular vein with good backflow. A guidewire was placed. Then using an 11 blade, a small incision was made in the patient's skin. The large-bore needle was removed. A dilator was passed over the guidewire. Once completed, a triple lumen catheter was placed over the guidewire with the guidewire then subsequently removed. All three ports were drawn and flushed with good flow. Then the catheter was sutured in place, and a sterile dressing was applied.     Number of sticks: 1.     Number of Kits used: 1.     Complications: No immediate complication.     Estimated blood loss: About 5 ml.     Comment: Patient tolerated the procedure well.     Placement:  CXR was ordered to confirm placement and is in good position.  Supervising physician: Dr. Chavez Moore, APRN - CNP

## 2025-02-15 NOTE — CONSULTS
[facility]                   [address] [place] [address]                              CONSULTATION      PATIENT NAME: JOANN LO        : 1960  MED REC NO: [id number]                        ROOM: 10  ACCOUNT NO: [id number]                       ADMIT DATE: 2025  PROVIDER: Rich Lopez DO    RENAL CONSULT    CONSULT DATE: 2025      HISTORY OF PRESENT ILLNESS:  This is a 64-year-old [place] male admitted to the hospital with confusion and difficulty with history.  He is being sent to the Radiology Department for x-rays.  On admission to the hospital.  He has known alcoholic cirrhosis.  In addition, atrial fibrillation, coronary artery disease, hyperparathyroidism, portal hypertension, sarcoidosis, diabetes mellitus type 2, and a prior history of GI bleed.  He cannot give a history at this time.  His GFR presently is 24 mL/minute and 1 month ago was at 34 mL/minute.  Blood pressure has been on the lower side since his admission to the hospital.    MEDICATIONS:  At the time of his admission; Neurontin, gabapentin, Florinef, insulin Lantus, Bactrim, Lopressor, mag oxide, Prograf, and Coumadin.    ALLERGIES:  TO MEDICATIONS, NONE ON FILE.      SOCIAL HISTORY:  .  Quit smoking 40 years ago.  Currently, no alcohol.    PHYSICAL EXAMINATION:  VITAL SIGNS:  The patient is 5 feet 7 inches, 167 pounds.  Blood pressure 112/50, heart rate 70, respirations 20.   HEENT:  Normocephalic.  The patient cannot give and answer questions appropriately.  Blood sugars were elevated on admission.  Normocephalic.  Pupils are reactive to light, and sclerae are clear.  Throat shows dry mucous membranes.  HEART:  Regular with 1/6 systolic murmur.  LUNGS:  Anterior and laterally were reviewed without wheezes, rales, or rhonchi.  ABDOMEN:  Soft.  No guarding or rigidity.  EXTREMITIES:  No edema.   SKIN:  Warm and dry.    IMPRESSIONS:  Acute kidney injury on chronic 
  DATE OF CONSULTATION  2025    CONSULTANT  Dr. Camilo     REQUESTING PHYSICIAN  Sheila Walker MD     PRIMARY CARDIOLOGIST    REASON FOR CONSULTATION  Chief Complaint   Patient presents with    Hyperglycemia       Hospital Day: 0       Patient is a 64 y.o. male with Pmhx of alcoholic cirrhosis s/p liver transplant x3 (,,), CKD, Atrial fibrillation, H/o CVA, H/o substance use disorder, HTN, CAD, h/o DVT/PE, portal HTN, sarcoidosis, T2DM,  and h/o GI bleed who presents with a chief complaint of AMS. Patient is followed on a regular basis by Cornelio Hardwick MD.     Cardiology was consulted for elevated troponin.     All history was obtained via chart review and nursing report. Patient unable to answer questions. No family at bedside. On 3L O2 via NC.   Per ED note patient became altered and son called EMS. He was hypoxic on presentation.   Patient being worked up for sepsis-- possible aspiration pneumonia.   Per chart review in 2023 echo showed preserved LV EF 66%, diastolic dysfunction. Last ischemic workup reported in 2018.     Troponin: 169,174  EKG: sinus tachycardia 134 bpm, RBBB      No past medical history on file.   Patient Active Problem List   Diagnosis    Community acquired pneumonia, bilateral    GIB (gastrointestinal bleeding)    ZAIN (acute kidney injury) (HCC)    Acute hypoxemic respiratory failure       No past surgical history on file.    Social History     Socioeconomic History    Marital status:    Tobacco Use    Smoking status: Former     Current packs/day: 0.00     Types: Cigarettes     Quit date:      Years since quittin.1    Smokeless tobacco: Never   Substance and Sexual Activity    Alcohol use: Not Currently     Comment: 19 years sober    Drug use: Not Currently     Comment: 19 years sober     Social Determinants of Health     Financial Resource Strain: Low Risk  (2023)    Received from [facility]    Overall Financial Resource Strain 
Infectious Disease     Patient Name: Oli Landaverde  Date: 2/15/2025  YOB: 1960  Medical Record Number: [id number]              History of Present Illness:    Multiple liver transplant form etoh cirrhosis, sarcoidosis,   Pulmonary sarcoidosis: Diagnosed in 2022. 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 (candida albicans, glabrata, VRE and Escherichia coli), incisional hernia s/p repair w/ SURGIPRO 4x6\" mesh on 6/24/2013, s/p 3rd liver transplant on 11/7/2018 CMV D-/R+ &  EBV D+/R+; increased-risk donor, and Hepatis C positive donor (VENTURA +), tx'ed w/ Mavyret to SVR, complicated by infected hepatic hematoma w/ VRE and pancreatitis w/ pancreatic necrosis since splenic artery was used for hepatic artery reconstruction and thrombosed splenic artery   # Chronic draining wound and 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 mesh w/ GBS and PsA s/p enterocutaneous fistula takedown and removal of associated infected mesh on 5/27/2020        Diabetes  alcoholic cirrhosis chronic renal insufficiency coronary disease history of DVT hyperparathyroid is him lupus anticoagulant portal hypertension sarcoidosis  History of MRSA positive nasal swab    Hospitalized at [facility] 1/2/2025 to 1/13/2025 pulmonary edema treated with empiric antibiotics cefepime vancomycin azithromycin    Presented with confusion change in mental status acute on chronic renal   And near syncopal episode 2/10/2025  failure acidosis lactic acid 3.5 creatinine 3.54  White blood cell count 11,600  Haemophilus influenza positive  Procalcitonin 3.6  Urinalysis showing 
Renal consult dictated  suggestions once he has x-rays and all labs return   
EXAM:    Vitals:  BP (!) 128/48   Pulse (!) 112   Temp (!) 102.1 °F (38.9 °C) (Oral)   Resp 20   Ht 1.7 m (5' 6.93\")   Wt 75.8 kg (167 lb)   SpO2 100%   BMI 26.21 kg/m²     General: Sedated, unresponsive  Head: normocephalic, atraumatic  Eyes:No gross abnormalities.  ENT:  MMM no lesions  Neck:  supple and no masses  Chest : Vent sounds, no wheezing, no rales, nontender, tympanic    Heart:: Heart sounds are normal.  Regular rate and rhythm without murmur, gallop or rub.  ABD:  symmetric, soft, no apparent tenderness guarding or rebound  Musculoskeletal : no cyanosis, no clubbing, and no edema  Neuro:   Unresponsive  Skin: No rashes or nodules noted.  Lymph node:  no cervical nodes  Urology: Yes [facility]   Psychiatric: unresponsive        Data Review  Recent Labs     02/14/25  0646 02/14/25 1818 02/14/25 2054 02/14/25 2214 02/14/25  2241 02/15/25  0759   WBC 7.8  --   --   --   --  11.6*   HGB 12.3* 10.2*   < > 9.4* 10.4* 10.8*   HCT 37.7* 32.5*  --  28.6*  --  32.8*     --   --   --   --  139    < > = values in this interval not displayed.      Recent Labs     02/14/25  0646 02/14/25 1817 02/14/25 2054 02/14/25 2214 02/14/25  2241    137  --  139  --    K 5.7* 6.2*  --  5.6*  --    CL 95 103  --  103  --    CO2 19* 13*  --  23  --    BUN 56* 58*  --  58*  --    CREATININE 2.86* 3.07* 3.1* 3.30* 3.1*   GLUCOSE 308* 144*  --  223*  --        MV Settings:     Vent Mode: AC/VC  Vt (Set, mL): 400 mL  Resp Rate (Set): 20 bpm  PEEP/CPAP (cmH2O): 5  Peak Inspiratory Pressure (cmH2O): 26 cmH2O  Mean Airway Pressure (cmH2O): 12 cmH20  I:E Ratio: 1:2.3    ABGs:   Recent Labs     02/14/25  0628 02/14/25 2054 02/14/25  2241   PHART 7.383 7.367 7.252*   MGW3VOQ 37 53* 66*   PO2ART 72* 44* 187*   MCD4ODK 22.1 30.2* 29.1*   BEART -3 5* 2   G7DUXZFT 94 77* 99   BVY0CRY 23 32 31     O2 Device: Ventilator  O2 Flow Rate (L/min): 4 L/min  Lab Results   Component Value Date/Time    LACTA 3.5 07/03/2022 04:38 PM 
2/14/2025  EXAMINATION: ONE XRAY VIEW OF THE CHEST 2/14/2025 7:04 am COMPARISON: 07/03/2022 HISTORY: ORDERING SYSTEM PROVIDED HISTORY: Sepsis TECHNOLOGIST PROVIDED HISTORY: Reason for exam:->Sepsis What reading provider will be dictating this exam?->CRC FINDINGS: Portable chest reveals heart to be enlarged.  There is increased markings identified lower lung fields bilaterally with small bilateral pleural effusions.  Findings concerning for bibasilar infiltrate.  Degenerative changes seen within the spine.  Vascular calcifications seen within the thoracic aorta.     Cardiomegaly with bibasilar infiltrates and small bilateral pleural effusions, aspiration is of concern.  Follow-up to resolution can be performed if indicated.     CT HEAD WO CONTRAST    Result Date: 2/10/2025  EXAMINATION: CT OF THE HEAD WITHOUT CONTRAST  2/10/2025 7:59 pm TECHNIQUE: CT of the head was performed without the administration of intravenous contrast. Automated exposure control, iterative reconstruction, and/or weight based adjustment of the mA/kV was utilized to reduce the radiation dose to as low as reasonably achievable. COMPARISON: None. HISTORY: ORDERING SYSTEM PROVIDED HISTORY: Near syncope TECHNOLOGIST PROVIDED HISTORY: Reason for exam:->Near syncope Has a \"code stroke\" or \"stroke alert\" been called?->No Decision Support Exception - unselect if not a suspected or confirmed emergency medical condition->Emergency Medical Condition (MA) What reading provider will be dictating this exam?->CRC FINDINGS: BRAIN/VENTRICLES: There is no intracranial hemorrhage, midline shift, or mass effect.  There is encephalomalacia on the basis of prior infarction within the left frontal lobe medially near the vertex.  There is mild cerebral volume loss. ORBITS: The orbits/globes are grossly unremarkable. SINUSES: There is mild mucosal thickening of the sphenoid sinus posteriorly on the left. SOFT TISSUES/SKULL:  No displaced calvarial fracture.  There is

## 2025-02-15 NOTE — PROCEDURES
PROCEDURE NOTE  Date: 2/14/2025   Name: Oli Landaverde  YOB: 1960    Procedures      Intubation Procedure Note    Indication: Respiratory failure, airway compromise, and airway protection    Consent: The patient provided verbal consent for this procedure.    Medications Used: etomidate intravenously, midazolam intravenously, and rocuronium intravenously    Procedure: The patient was placed in the appropriate position.  Cricoid pressure was not required.  Intubation was performed by direct laryngoscopy using a laryngoscope and a 7.5 cuffed endotracheal tube.  The cuff was then inflated and the tube was secured appropriately at a distance of 23 cm to the dental ridge.  Initial confirmation of placement included bilateral breath sounds, an end tidal CO2 detector, absence of sounds over the stomach, tube fogging, adequate chest rise, adequate pulse oximetry reading, and improved skin color.  A chest x-ray to verify correct placement of the tube showed appropriate tube position.    The patient tolerated the procedure well.     Complications: None  Supervising physician: Dr. Byrne

## 2025-02-16 PROBLEM — N39.0 UTI (URINARY TRACT INFECTION) DUE TO ENTEROCOCCUS: Status: ACTIVE | Noted: 2025-02-16

## 2025-02-16 PROBLEM — B95.2 UTI (URINARY TRACT INFECTION) DUE TO ENTEROCOCCUS: Status: ACTIVE | Noted: 2025-02-16

## 2025-02-16 LAB
ALBUMIN SERPL-MCNC: 2.6 G/DL (ref 3.5–4.6)
ALBUMIN SERPL-MCNC: 2.7 G/DL (ref 3.5–4.6)
ALBUMIN SERPL-MCNC: 2.7 G/DL (ref 3.5–4.6)
ALP SERPL-CCNC: 82 U/L (ref 35–104)
ALT SERPL-CCNC: 32 U/L (ref 0–41)
AMMONIA PLAS-SCNC: 24 UMOL/L (ref 16–60)
ANION GAP SERPL CALCULATED.3IONS-SCNC: 13 MEQ/L (ref 9–15)
ANION GAP SERPL CALCULATED.3IONS-SCNC: 14 MEQ/L (ref 9–15)
ANION GAP SERPL CALCULATED.3IONS-SCNC: 17 MEQ/L (ref 9–15)
ANION GAP SERPL CALCULATED.3IONS-SCNC: 17 MEQ/L (ref 9–15)
ANION GAP SERPL CALCULATED.3IONS-SCNC: 20 MEQ/L (ref 9–15)
ANTI-XA UNFRAC HEPARIN: 0.65 IU/ML
ANTI-XA UNFRAC HEPARIN: 0.8 IU/ML
ANTI-XA UNFRAC HEPARIN: 0.82 IU/ML
ANTI-XA UNFRAC HEPARIN: 0.88 IU/ML
AST SERPL-CCNC: 58 U/L (ref 0–40)
BACTERIA UR CULT: ABNORMAL
BACTERIA UR CULT: ABNORMAL
BASOPHILS # BLD: 0 K/UL (ref 0–0.2)
BASOPHILS NFR BLD: 0.2 %
BILIRUB SERPL-MCNC: 0.4 MG/DL (ref 0.2–0.7)
BUN SERPL-MCNC: 58 MG/DL (ref 8–23)
BUN SERPL-MCNC: 61 MG/DL (ref 8–23)
BUN SERPL-MCNC: 62 MG/DL (ref 8–23)
BUN SERPL-MCNC: 64 MG/DL (ref 8–23)
BUN SERPL-MCNC: 68 MG/DL (ref 8–23)
CALCIUM SERPL-MCNC: 6.5 MG/DL (ref 8.5–9.9)
CALCIUM SERPL-MCNC: 6.7 MG/DL (ref 8.5–9.9)
CHLORIDE SERPL-SCNC: 104 MEQ/L (ref 95–107)
CHLORIDE SERPL-SCNC: 106 MEQ/L (ref 95–107)
CHLORIDE SERPL-SCNC: 107 MEQ/L (ref 95–107)
CHLORIDE SERPL-SCNC: 108 MEQ/L (ref 95–107)
CHLORIDE SERPL-SCNC: 110 MEQ/L (ref 95–107)
CO2 SERPL-SCNC: 19 MEQ/L (ref 20–31)
CO2 SERPL-SCNC: 22 MEQ/L (ref 20–31)
CO2 SERPL-SCNC: 22 MEQ/L (ref 20–31)
CO2 SERPL-SCNC: 25 MEQ/L (ref 20–31)
CO2 SERPL-SCNC: 25 MEQ/L (ref 20–31)
CREAT SERPL-MCNC: 3.64 MG/DL (ref 0.7–1.2)
CREAT SERPL-MCNC: 3.64 MG/DL (ref 0.7–1.2)
CREAT SERPL-MCNC: 3.67 MG/DL (ref 0.7–1.2)
CREAT SERPL-MCNC: 3.74 MG/DL (ref 0.7–1.2)
CREAT SERPL-MCNC: 3.75 MG/DL (ref 0.7–1.2)
EOSINOPHIL # BLD: 0 K/UL (ref 0–0.7)
EOSINOPHIL NFR BLD: 0 %
ERYTHROCYTE [DISTWIDTH] IN BLOOD BY AUTOMATED COUNT: 15.3 % (ref 11.5–14.5)
GLOBULIN SER CALC-MCNC: 3 G/DL (ref 2.3–3.5)
GLUCOSE BLD-MCNC: 117 MG/DL (ref 70–99)
GLUCOSE BLD-MCNC: 148 MG/DL (ref 70–99)
GLUCOSE BLD-MCNC: 207 MG/DL (ref 70–99)
GLUCOSE BLD-MCNC: 225 MG/DL (ref 70–99)
GLUCOSE BLD-MCNC: 230 MG/DL (ref 70–99)
GLUCOSE SERPL-MCNC: 120 MG/DL (ref 70–99)
GLUCOSE SERPL-MCNC: 189 MG/DL (ref 70–99)
GLUCOSE SERPL-MCNC: 212 MG/DL (ref 70–99)
GLUCOSE SERPL-MCNC: 216 MG/DL (ref 70–99)
GLUCOSE SERPL-MCNC: 227 MG/DL (ref 70–99)
HCT VFR BLD AUTO: 32.7 % (ref 42–52)
HGB BLD-MCNC: 10.3 G/DL (ref 14–18)
INR PPP: 1.4
LYMPHOCYTES # BLD: 1.5 K/UL (ref 1–4.8)
LYMPHOCYTES NFR BLD: 16.6 %
MCH RBC QN AUTO: 29.9 PG (ref 27–31.3)
MCHC RBC AUTO-ENTMCNC: 31.5 % (ref 33–37)
MCV RBC AUTO: 95.1 FL (ref 79–92.2)
MONOCYTES # BLD: 0.4 K/UL (ref 0.2–0.8)
MONOCYTES NFR BLD: 4.3 %
NEUTROPHILS # BLD: 6.7 K/UL (ref 1.4–6.5)
NEUTS SEG NFR BLD: 75.2 %
ORGANISM: ABNORMAL
PERFORMED ON: ABNORMAL
PHOSPHATE SERPL-MCNC: 3.9 MG/DL (ref 2.3–4.8)
PHOSPHATE SERPL-MCNC: 3.9 MG/DL (ref 2.3–4.8)
PHOSPHATE SERPL-MCNC: 4.7 MG/DL (ref 2.3–4.8)
PHOSPHATE SERPL-MCNC: 5 MG/DL (ref 2.3–4.8)
PLATELET # BLD AUTO: 117 K/UL (ref 130–400)
POTASSIUM SERPL-SCNC: 4.2 MEQ/L (ref 3.4–4.9)
POTASSIUM SERPL-SCNC: 4.3 MEQ/L (ref 3.4–4.9)
POTASSIUM SERPL-SCNC: 4.4 MEQ/L (ref 3.4–4.9)
POTASSIUM SERPL-SCNC: 4.4 MEQ/L (ref 3.4–4.9)
POTASSIUM SERPL-SCNC: 4.5 MEQ/L (ref 3.4–4.9)
PROT SERPL-MCNC: 5.7 G/DL (ref 6.3–8)
PROTHROMBIN TIME: 17.9 SEC (ref 12.3–14.9)
RBC # BLD AUTO: 3.44 M/UL (ref 4.7–6.1)
REASON FOR REJECTION: NORMAL
REJECTED TEST: NORMAL
SODIUM SERPL-SCNC: 143 MEQ/L (ref 135–144)
SODIUM SERPL-SCNC: 145 MEQ/L (ref 135–144)
SODIUM SERPL-SCNC: 146 MEQ/L (ref 135–144)
SODIUM SERPL-SCNC: 146 MEQ/L (ref 135–144)
SODIUM SERPL-SCNC: 149 MEQ/L (ref 135–144)
VANCOMYCIN TROUGH SERPL-MCNC: 15.9 UG/ML (ref 10–20)
WBC # BLD AUTO: 8.9 K/UL (ref 4.8–10.8)

## 2025-02-16 PROCEDURE — 2580000003 HC RX 258: Performed by: INTERNAL MEDICINE

## 2025-02-16 PROCEDURE — 82140 ASSAY OF AMMONIA: CPT

## 2025-02-16 PROCEDURE — 80053 COMPREHEN METABOLIC PANEL: CPT

## 2025-02-16 PROCEDURE — 6360000002 HC RX W HCPCS: Performed by: INTERNAL MEDICINE

## 2025-02-16 PROCEDURE — 6370000000 HC RX 637 (ALT 250 FOR IP): Performed by: INTERNAL MEDICINE

## 2025-02-16 PROCEDURE — 94660 CPAP INITIATION&MGMT: CPT

## 2025-02-16 PROCEDURE — 99291 CRITICAL CARE FIRST HOUR: CPT | Performed by: INTERNAL MEDICINE

## 2025-02-16 PROCEDURE — 99232 SBSQ HOSP IP/OBS MODERATE 35: CPT | Performed by: INTERNAL MEDICINE

## 2025-02-16 PROCEDURE — 80202 ASSAY OF VANCOMYCIN: CPT

## 2025-02-16 PROCEDURE — 85520 HEPARIN ASSAY: CPT

## 2025-02-16 PROCEDURE — 94761 N-INVAS EAR/PLS OXIMETRY MLT: CPT

## 2025-02-16 PROCEDURE — 85025 COMPLETE CBC W/AUTO DIFF WBC: CPT

## 2025-02-16 PROCEDURE — 2700000000 HC OXYGEN THERAPY PER DAY

## 2025-02-16 PROCEDURE — 94003 VENT MGMT INPAT SUBQ DAY: CPT

## 2025-02-16 PROCEDURE — 36592 COLLECT BLOOD FROM PICC: CPT

## 2025-02-16 PROCEDURE — 93005 ELECTROCARDIOGRAM TRACING: CPT | Performed by: INTERNAL MEDICINE

## 2025-02-16 PROCEDURE — 2500000003 HC RX 250 WO HCPCS: Performed by: INTERNAL MEDICINE

## 2025-02-16 PROCEDURE — 2000000000 HC ICU R&B

## 2025-02-16 PROCEDURE — 85610 PROTHROMBIN TIME: CPT

## 2025-02-16 RX ORDER — INSULIN GLARGINE 100 [IU]/ML
25 INJECTION, SOLUTION SUBCUTANEOUS DAILY
Status: DISCONTINUED | OUTPATIENT
Start: 2025-02-17 | End: 2025-02-18

## 2025-02-16 RX ORDER — NOREPINEPHRINE BITARTRATE 0.06 MG/ML
1-100 INJECTION, SOLUTION INTRAVENOUS CONTINUOUS
DISCHARGE
Start: 2025-02-16

## 2025-02-16 RX ORDER — GLUCAGON 1 MG/ML
1 KIT INJECTION PRN
DISCHARGE
Start: 2025-02-16

## 2025-02-16 RX ORDER — HEPARIN SODIUM 10000 [USP'U]/100ML
5-30 INJECTION, SOLUTION INTRAVENOUS CONTINUOUS
DISCHARGE
Start: 2025-02-16

## 2025-02-16 RX ORDER — INSULIN GLARGINE 100 [IU]/ML
14 INJECTION, SOLUTION SUBCUTANEOUS ONCE
Status: COMPLETED | OUTPATIENT
Start: 2025-02-16 | End: 2025-02-16

## 2025-02-16 RX ORDER — INSULIN GLARGINE 100 [IU]/ML
25 INJECTION, SOLUTION SUBCUTANEOUS DAILY
DISCHARGE
Start: 2025-02-17

## 2025-02-16 RX ORDER — INSULIN LISPRO 100 [IU]/ML
0-16 INJECTION, SOLUTION INTRAVENOUS; SUBCUTANEOUS EVERY 4 HOURS
DISCHARGE
Start: 2025-02-16

## 2025-02-16 RX ORDER — INSULIN GLARGINE 100 [IU]/ML
8 INJECTION, SOLUTION SUBCUTANEOUS DAILY
Status: DISCONTINUED | OUTPATIENT
Start: 2025-02-16 | End: 2025-02-16

## 2025-02-16 RX ORDER — OSELTAMIVIR PHOSPHATE 6 MG/ML
30 FOR SUSPENSION ORAL DAILY
DISCHARGE
Start: 2025-02-17 | End: 2025-02-19

## 2025-02-16 RX ORDER — TACROLIMUS 1 MG/1
1 CAPSULE ORAL DAILY
DISCHARGE
Start: 2025-02-17

## 2025-02-16 RX ORDER — HYDROCORTISONE SODIUM SUCCINATE 100 MG/2ML
50 INJECTION INTRAMUSCULAR; INTRAVENOUS EVERY 8 HOURS
DISCHARGE
Start: 2025-02-16

## 2025-02-16 RX ORDER — PROPOFOL 10 MG/ML
5-50 INJECTION, EMULSION INTRAVENOUS CONTINUOUS
DISCHARGE
Start: 2025-02-16

## 2025-02-16 RX ORDER — HEPARIN SODIUM 1000 [USP'U]/ML
80 INJECTION, SOLUTION INTRAVENOUS; SUBCUTANEOUS PRN
DISCHARGE
Start: 2025-02-16

## 2025-02-16 RX ORDER — MEROPENEM 1 G/1
500 INJECTION, POWDER, FOR SOLUTION INTRAVENOUS EVERY 12 HOURS
DISCHARGE
Start: 2025-02-16

## 2025-02-16 RX ORDER — TACROLIMUS 1 MG/1
1 CAPSULE ORAL DAILY
Status: DISCONTINUED | OUTPATIENT
Start: 2025-02-17 | End: 2025-02-20 | Stop reason: HOSPADM

## 2025-02-16 RX ORDER — HEPARIN SODIUM 1000 [USP'U]/ML
40 INJECTION, SOLUTION INTRAVENOUS; SUBCUTANEOUS PRN
DISCHARGE
Start: 2025-02-16

## 2025-02-16 RX ORDER — INSULIN LISPRO 100 [IU]/ML
0-16 INJECTION, SOLUTION INTRAVENOUS; SUBCUTANEOUS
Status: DISCONTINUED | OUTPATIENT
Start: 2025-02-16 | End: 2025-02-20 | Stop reason: HOSPADM

## 2025-02-16 RX ADMIN — HYDROCORTISONE SODIUM SUCCINATE 50 MG: 100 INJECTION INTRAMUSCULAR; INTRAVENOUS at 16:38

## 2025-02-16 RX ADMIN — INSULIN LISPRO 4 UNITS: 100 INJECTION, SOLUTION INTRAVENOUS; SUBCUTANEOUS at 07:16

## 2025-02-16 RX ADMIN — INSULIN GLARGINE 14 UNITS: 100 INJECTION, SOLUTION SUBCUTANEOUS at 11:39

## 2025-02-16 RX ADMIN — INSULIN GLARGINE 8 UNITS: 100 INJECTION, SOLUTION SUBCUTANEOUS at 09:02

## 2025-02-16 RX ADMIN — VANCOMYCIN HYDROCHLORIDE 1250 MG: 1.25 INJECTION, POWDER, LYOPHILIZED, FOR SOLUTION INTRAVENOUS at 21:08

## 2025-02-16 RX ADMIN — Medication 150 MCG/HR: at 17:56

## 2025-02-16 RX ADMIN — CHLORHEXIDINE GLUCONATE, 0.12% ORAL RINSE 15 ML: 1.2 SOLUTION DENTAL at 07:50

## 2025-02-16 RX ADMIN — Medication 150 MCG/HR: at 11:31

## 2025-02-16 RX ADMIN — HEPARIN SODIUM 18 UNITS/KG/HR: 10000 INJECTION, SOLUTION INTRAVENOUS at 07:18

## 2025-02-16 RX ADMIN — Medication 150 MCG/HR: at 03:39

## 2025-02-16 RX ADMIN — INSULIN LISPRO 8 UNITS: 100 INJECTION, SOLUTION INTRAVENOUS; SUBCUTANEOUS at 11:39

## 2025-02-16 RX ADMIN — SODIUM CHLORIDE, PRESERVATIVE FREE 40 MG: 5 INJECTION INTRAVENOUS at 21:03

## 2025-02-16 RX ADMIN — CHLORHEXIDINE GLUCONATE, 0.12% ORAL RINSE 15 ML: 1.2 SOLUTION DENTAL at 19:52

## 2025-02-16 RX ADMIN — SODIUM CHLORIDE, PRESERVATIVE FREE 40 MG: 5 INJECTION INTRAVENOUS at 09:11

## 2025-02-16 RX ADMIN — FLUDROCORTISONE ACETATE 0.1 MG: 0.1 TABLET ORAL at 09:00

## 2025-02-16 RX ADMIN — INSULIN LISPRO 2 UNITS: 100 INJECTION, SOLUTION INTRAVENOUS; SUBCUTANEOUS at 00:34

## 2025-02-16 RX ADMIN — TACROLIMUS 1 MG: 1 CAPSULE ORAL at 08:59

## 2025-02-16 RX ADMIN — HYDROCORTISONE SODIUM SUCCINATE 50 MG: 100 INJECTION INTRAMUSCULAR; INTRAVENOUS at 00:42

## 2025-02-16 RX ADMIN — DEXTROSE MONOHYDRATE 250 ML: 50 INJECTION, SOLUTION INTRAVENOUS at 09:53

## 2025-02-16 RX ADMIN — MEROPENEM 500 MG: 500 INJECTION INTRAVENOUS at 07:10

## 2025-02-16 RX ADMIN — MEROPENEM 500 MG: 500 INJECTION INTRAVENOUS at 19:51

## 2025-02-16 RX ADMIN — OSELTAMIVIR PHOSPHATE 30 MG: 6 POWDER, FOR SUSPENSION ORAL at 09:17

## 2025-02-16 RX ADMIN — SODIUM CHLORIDE: 9 INJECTION, SOLUTION INTRAVENOUS at 00:41

## 2025-02-16 RX ADMIN — HYDROCORTISONE SODIUM SUCCINATE 50 MG: 100 INJECTION INTRAMUSCULAR; INTRAVENOUS at 09:14

## 2025-02-16 RX ADMIN — PROPOFOL 20 MCG/KG/MIN: 10 INJECTION, EMULSION INTRAVENOUS at 19:46

## 2025-02-16 ASSESSMENT — PAIN SCALES - GENERAL
PAINLEVEL_OUTOF10: 0
PAINLEVEL_OUTOF10: 0
PAINLEVEL_OUTOF10: 3
PAINLEVEL_OUTOF10: 0

## 2025-02-16 ASSESSMENT — PULMONARY FUNCTION TESTS
PIF_VALUE: 33
PIF_VALUE: 29
PIF_VALUE: 28
PIF_VALUE: 29
PIF_VALUE: 31
PIF_VALUE: 32

## 2025-02-16 NOTE — DISCHARGE SUMMARY
HISTORY: ORDERING SYSTEM PROVIDED HISTORY: intubation and cvc placement TECHNOLOGIST PROVIDED HISTORY: Reason for exam:->intubation and cvc placement What reading provider will be dictating this exam?->CRC     Endotracheal tube approximately 1.7 cm above the alexus.  Enteric tube extends beyond the field of view in the left upper quadrant.  Right-sided internal jugular catheter tip overlies the distal SVC/cavoatrial junction region. Cardiomediastinal silhouette is enlarged. Perihilar and bibasilar opacification, correlate for edema versus infection versus atelectasis.  No gross pneumothorax.     US RETROPERITONEAL LIMITED    Result Date: 2/14/2025  EXAMINATION: ULTRASOUND OF THE KIDNEYS 2/14/2025 12:00 pm COMPARISON: None. HISTORY: ORDERING SYSTEM PROVIDED HISTORY: ZAIN r/o obstructive uropathy, Acute on chronic renal failure TECHNOLOGIST PROVIDED HISTORY: Reason for exam:->ZAIN r/o obstructive uropathy What reading provider will be dictating this exam?->CRC FINDINGS: The right kidney measures 8.5 cm in length and the left kidney measures 9.4 cm in length. Kidneys demonstrate normal cortical echogenicity.  No hydronephrosis identified of either kidney.  There is some echogenic foci within the left kidney to suggest possible nonobstructing stones.  No solid cortical mass or renal cortical cyst seen within the kidneys.     No hydronephrosis identified of either kidney. Possible nonobstructing stones within the left kidney.     CT HEAD WO CONTRAST    Result Date: 2/14/2025  EXAMINATION: CT OF THE HEAD WITHOUT CONTRAST  2/14/2025 9:06 am TECHNIQUE: CT of the head was performed without the administration of intravenous contrast. Automated exposure control, iterative reconstruction, and/or weight based adjustment of the mA/kV was utilized to reduce the radiation dose to as low as reasonably achievable. COMPARISON: 02/10/2025 HISTORY: ORDERING SYSTEM PROVIDED HISTORY: ams TECHNOLOGIST PROVIDED HISTORY: Reason for exam:->ams

## 2025-02-17 LAB
ALBUMIN SERPL-MCNC: 2.4 G/DL (ref 3.5–4.6)
ALBUMIN SERPL-MCNC: 2.5 G/DL (ref 3.5–4.6)
ALBUMIN SERPL-MCNC: 2.6 G/DL (ref 3.5–4.6)
ALBUMIN SERPL-MCNC: 2.7 G/DL (ref 3.5–4.6)
ALBUMIN SERPL-MCNC: 2.7 G/DL (ref 3.5–4.6)
ALP SERPL-CCNC: 54 U/L (ref 35–104)
ALT SERPL-CCNC: 28 U/L (ref 0–41)
AMMONIA PLAS-SCNC: 15 UMOL/L (ref 16–60)
ANION GAP SERPL CALCULATED.3IONS-SCNC: 12 MEQ/L (ref 9–15)
ANION GAP SERPL CALCULATED.3IONS-SCNC: 14 MEQ/L (ref 9–15)
ANION GAP SERPL CALCULATED.3IONS-SCNC: 15 MEQ/L (ref 9–15)
ANION GAP SERPL CALCULATED.3IONS-SCNC: 16 MEQ/L (ref 9–15)
ANION GAP SERPL CALCULATED.3IONS-SCNC: 16 MEQ/L (ref 9–15)
ANISOCYTOSIS BLD QL SMEAR: ABNORMAL
ANTI-XA UNFRAC HEPARIN: 0.35 IU/ML
ANTI-XA UNFRAC HEPARIN: 0.36 IU/ML
ANTI-XA UNFRAC HEPARIN: 0.45 IU/ML
ANTI-XA UNFRAC HEPARIN: 0.66 IU/ML
AST SERPL-CCNC: 42 U/L (ref 0–40)
BASOPHILS # BLD: 0 K/UL (ref 0–0.2)
BASOPHILS NFR BLD: 0.2 %
BILIRUB SERPL-MCNC: 0.3 MG/DL (ref 0.2–0.7)
BUN SERPL-MCNC: 71 MG/DL (ref 8–23)
BUN SERPL-MCNC: 72 MG/DL (ref 8–23)
BUN SERPL-MCNC: 74 MG/DL (ref 8–23)
BUN SERPL-MCNC: 75 MG/DL (ref 8–23)
BUN SERPL-MCNC: 75 MG/DL (ref 8–23)
BURR CELLS: ABNORMAL
CALCIUM SERPL-MCNC: 6.5 MG/DL (ref 8.5–9.9)
CALCIUM SERPL-MCNC: 6.6 MG/DL (ref 8.5–9.9)
CALCIUM SERPL-MCNC: 6.6 MG/DL (ref 8.5–9.9)
CALCIUM SERPL-MCNC: 6.8 MG/DL (ref 8.5–9.9)
CALCIUM SERPL-MCNC: 6.9 MG/DL (ref 8.5–9.9)
CHLORIDE SERPL-SCNC: 106 MEQ/L (ref 95–107)
CHLORIDE SERPL-SCNC: 106 MEQ/L (ref 95–107)
CHLORIDE SERPL-SCNC: 108 MEQ/L (ref 95–107)
CHLORIDE SERPL-SCNC: 108 MEQ/L (ref 95–107)
CHLORIDE SERPL-SCNC: 112 MEQ/L (ref 95–107)
CO2 SERPL-SCNC: 23 MEQ/L (ref 20–31)
CO2 SERPL-SCNC: 24 MEQ/L (ref 20–31)
CO2 SERPL-SCNC: 24 MEQ/L (ref 20–31)
CO2 SERPL-SCNC: 26 MEQ/L (ref 20–31)
CO2 SERPL-SCNC: 26 MEQ/L (ref 20–31)
CREAT SERPL-MCNC: 3.11 MG/DL (ref 0.7–1.2)
CREAT SERPL-MCNC: 3.33 MG/DL (ref 0.7–1.2)
CREAT SERPL-MCNC: 3.34 MG/DL (ref 0.7–1.2)
CREAT SERPL-MCNC: 3.41 MG/DL (ref 0.7–1.2)
CREAT SERPL-MCNC: 3.59 MG/DL (ref 0.7–1.2)
EKG ATRIAL RATE: 63 BPM
EKG P AXIS: 48 DEGREES
EKG P-R INTERVAL: 162 MS
EKG Q-T INTERVAL: 438 MS
EKG QRS DURATION: 128 MS
EKG QTC CALCULATION (BAZETT): 448 MS
EKG R AXIS: 0 DEGREES
EKG T AXIS: 29 DEGREES
EKG VENTRICULAR RATE: 63 BPM
EOSINOPHIL # BLD: 0 K/UL (ref 0–0.7)
EOSINOPHIL NFR BLD: 0 %
ERYTHROCYTE [DISTWIDTH] IN BLOOD BY AUTOMATED COUNT: 14.9 % (ref 11.5–14.5)
GLOBULIN SER CALC-MCNC: 2.8 G/DL (ref 2.3–3.5)
GLUCOSE BLD-MCNC: 105 MG/DL (ref 70–99)
GLUCOSE BLD-MCNC: 139 MG/DL (ref 70–99)
GLUCOSE BLD-MCNC: 181 MG/DL (ref 70–99)
GLUCOSE BLD-MCNC: 190 MG/DL (ref 70–99)
GLUCOSE BLD-MCNC: 191 MG/DL (ref 70–99)
GLUCOSE BLD-MCNC: 232 MG/DL (ref 70–99)
GLUCOSE SERPL-MCNC: 115 MG/DL (ref 70–99)
GLUCOSE SERPL-MCNC: 188 MG/DL (ref 70–99)
GLUCOSE SERPL-MCNC: 191 MG/DL (ref 70–99)
GLUCOSE SERPL-MCNC: 238 MG/DL (ref 70–99)
GLUCOSE SERPL-MCNC: 241 MG/DL (ref 70–99)
HCT VFR BLD AUTO: 26.8 % (ref 42–52)
HGB BLD-MCNC: 8.8 G/DL (ref 14–18)
INR PPP: 1.2
LYMPHOCYTES # BLD: 2.3 K/UL (ref 1–4.8)
LYMPHOCYTES NFR BLD: 45 %
MCH RBC QN AUTO: 31.1 PG (ref 27–31.3)
MCHC RBC AUTO-ENTMCNC: 32.8 % (ref 33–37)
MCV RBC AUTO: 94.7 FL (ref 79–92.2)
MONOCYTES # BLD: 0.1 K/UL (ref 0.2–0.8)
MONOCYTES NFR BLD: 2.9 %
NEUTROPHILS # BLD: 2.5 K/UL (ref 1.4–6.5)
NEUTS BAND NFR BLD MANUAL: 2 %
NEUTS SEG NFR BLD: 47 %
PERFORMED ON: ABNORMAL
PHOSPHATE SERPL-MCNC: 3 MG/DL (ref 2.3–4.8)
PHOSPHATE SERPL-MCNC: 3.7 MG/DL (ref 2.3–4.8)
PHOSPHATE SERPL-MCNC: 3.9 MG/DL (ref 2.3–4.8)
PHOSPHATE SERPL-MCNC: 3.9 MG/DL (ref 2.3–4.8)
PLATELET # BLD AUTO: 97 K/UL (ref 130–400)
PLATELET BLD QL SMEAR: ABNORMAL
POIKILOCYTOSIS BLD QL SMEAR: ABNORMAL
POTASSIUM SERPL-SCNC: 3.5 MEQ/L (ref 3.4–4.9)
POTASSIUM SERPL-SCNC: 3.6 MEQ/L (ref 3.4–4.9)
POTASSIUM SERPL-SCNC: 4 MEQ/L (ref 3.4–4.9)
POTASSIUM SERPL-SCNC: 4.1 MEQ/L (ref 3.4–4.9)
POTASSIUM SERPL-SCNC: 4.4 MEQ/L (ref 3.4–4.9)
PROCALCITONIN SERPL IA-MCNC: 33.98 NG/ML (ref 0–0.15)
PROMYELOCYTES NFR BLD MANUAL: 1 %
PROT SERPL-MCNC: 5.2 G/DL (ref 6.3–8)
PROTHROMBIN TIME: 15.4 SEC (ref 12.3–14.9)
RBC # BLD AUTO: 2.83 M/UL (ref 4.7–6.1)
SLIDE REVIEW: ABNORMAL
SODIUM SERPL-SCNC: 144 MEQ/L (ref 135–144)
SODIUM SERPL-SCNC: 145 MEQ/L (ref 135–144)
SODIUM SERPL-SCNC: 147 MEQ/L (ref 135–144)
SODIUM SERPL-SCNC: 148 MEQ/L (ref 135–144)
SODIUM SERPL-SCNC: 152 MEQ/L (ref 135–144)
VANCOMYCIN TROUGH SERPL-MCNC: 18.7 UG/ML (ref 10–20)
VARIANT LYMPHS NFR BLD: 2 %
WBC # BLD AUTO: 4.9 K/UL (ref 4.8–10.8)

## 2025-02-17 PROCEDURE — 2580000003 HC RX 258: Performed by: INTERNAL MEDICINE

## 2025-02-17 PROCEDURE — 99232 SBSQ HOSP IP/OBS MODERATE 35: CPT | Performed by: INTERNAL MEDICINE

## 2025-02-17 PROCEDURE — 2700000000 HC OXYGEN THERAPY PER DAY

## 2025-02-17 PROCEDURE — 82140 ASSAY OF AMMONIA: CPT

## 2025-02-17 PROCEDURE — 2000000000 HC ICU R&B

## 2025-02-17 PROCEDURE — 51798 US URINE CAPACITY MEASURE: CPT

## 2025-02-17 PROCEDURE — 2500000003 HC RX 250 WO HCPCS: Performed by: INTERNAL MEDICINE

## 2025-02-17 PROCEDURE — 80202 ASSAY OF VANCOMYCIN: CPT

## 2025-02-17 PROCEDURE — 87899 AGENT NOS ASSAY W/OPTIC: CPT

## 2025-02-17 PROCEDURE — 99291 CRITICAL CARE FIRST HOUR: CPT | Performed by: INTERNAL MEDICINE

## 2025-02-17 PROCEDURE — 80053 COMPREHEN METABOLIC PANEL: CPT

## 2025-02-17 PROCEDURE — 85025 COMPLETE CBC W/AUTO DIFF WBC: CPT

## 2025-02-17 PROCEDURE — 6360000002 HC RX W HCPCS: Performed by: INTERNAL MEDICINE

## 2025-02-17 PROCEDURE — 84145 PROCALCITONIN (PCT): CPT

## 2025-02-17 PROCEDURE — 94660 CPAP INITIATION&MGMT: CPT

## 2025-02-17 PROCEDURE — 93010 ELECTROCARDIOGRAM REPORT: CPT | Performed by: INTERNAL MEDICINE

## 2025-02-17 PROCEDURE — 51701 INSERT BLADDER CATHETER: CPT

## 2025-02-17 PROCEDURE — 85520 HEPARIN ASSAY: CPT

## 2025-02-17 PROCEDURE — 85610 PROTHROMBIN TIME: CPT

## 2025-02-17 PROCEDURE — 87641 MR-STAPH DNA AMP PROBE: CPT

## 2025-02-17 PROCEDURE — 6370000000 HC RX 637 (ALT 250 FOR IP): Performed by: INTERNAL MEDICINE

## 2025-02-17 PROCEDURE — 94003 VENT MGMT INPAT SUBQ DAY: CPT

## 2025-02-17 PROCEDURE — 36415 COLL VENOUS BLD VENIPUNCTURE: CPT

## 2025-02-17 RX ADMIN — HYDROCORTISONE SODIUM SUCCINATE 50 MG: 100 INJECTION INTRAMUSCULAR; INTRAVENOUS at 09:54

## 2025-02-17 RX ADMIN — CHLORHEXIDINE GLUCONATE, 0.12% ORAL RINSE 15 ML: 1.2 SOLUTION DENTAL at 20:18

## 2025-02-17 RX ADMIN — INSULIN GLARGINE 25 UNITS: 100 INJECTION, SOLUTION SUBCUTANEOUS at 08:20

## 2025-02-17 RX ADMIN — INSULIN LISPRO 4 UNITS: 100 INJECTION, SOLUTION INTRAVENOUS; SUBCUTANEOUS at 17:01

## 2025-02-17 RX ADMIN — MEROPENEM 500 MG: 500 INJECTION INTRAVENOUS at 08:28

## 2025-02-17 RX ADMIN — NOREPINEPHRINE BITARTRATE 5 MCG/MIN: 64 SOLUTION INTRAVENOUS at 13:04

## 2025-02-17 RX ADMIN — Medication 125 MCG/HR: at 01:15

## 2025-02-17 RX ADMIN — INSULIN LISPRO 4 UNITS: 100 INJECTION, SOLUTION INTRAVENOUS; SUBCUTANEOUS at 08:20

## 2025-02-17 RX ADMIN — PROPOFOL 30 MCG/KG/MIN: 10 INJECTION, EMULSION INTRAVENOUS at 02:22

## 2025-02-17 RX ADMIN — PROPOFOL 25 MCG/KG/MIN: 10 INJECTION, EMULSION INTRAVENOUS at 12:47

## 2025-02-17 RX ADMIN — MEROPENEM 500 MG: 500 INJECTION INTRAVENOUS at 20:12

## 2025-02-17 RX ADMIN — HYDROCORTISONE SODIUM SUCCINATE 50 MG: 100 INJECTION INTRAMUSCULAR; INTRAVENOUS at 01:15

## 2025-02-17 RX ADMIN — CHLORHEXIDINE GLUCONATE, 0.12% ORAL RINSE 15 ML: 1.2 SOLUTION DENTAL at 08:20

## 2025-02-17 RX ADMIN — INSULIN LISPRO 4 UNITS: 100 INJECTION, SOLUTION INTRAVENOUS; SUBCUTANEOUS at 04:00

## 2025-02-17 RX ADMIN — SODIUM CHLORIDE, PRESERVATIVE FREE 40 MG: 5 INJECTION INTRAVENOUS at 20:19

## 2025-02-17 RX ADMIN — SODIUM CHLORIDE, PRESERVATIVE FREE 40 MG: 5 INJECTION INTRAVENOUS at 08:20

## 2025-02-17 RX ADMIN — TACROLIMUS 1 MG: 1 CAPSULE ORAL at 08:20

## 2025-02-17 RX ADMIN — OSELTAMIVIR PHOSPHATE 30 MG: 6 POWDER, FOR SUSPENSION ORAL at 09:54

## 2025-02-17 RX ADMIN — Medication 75 MCG/HR: at 12:50

## 2025-02-17 RX ADMIN — FLUDROCORTISONE ACETATE 0.1 MG: 0.1 TABLET ORAL at 09:54

## 2025-02-17 ASSESSMENT — PULMONARY FUNCTION TESTS
PIF_VALUE: 34
PIF_VALUE: 30
PIF_VALUE: 28
PIF_VALUE: 31
PIF_VALUE: 29
PIF_VALUE: 25

## 2025-02-17 ASSESSMENT — PAIN SCALES - GENERAL
PAINLEVEL_OUTOF10: 0

## 2025-02-17 NOTE — INTERDISCIPLINARY ROUNDS
Spiritual Care Services     Summary of Visit:    Attended ICU Rounds. Patient on vent, been approved for transport and transfer to [facility] - liver transplant history, no family present, spouse in ICU BED 4. No ruth tradition.     Encounter Summary  Encounter Overview/Reason: Interdisciplinary rounds  Service Provided For: Patient  Referral/Consult From: Rounding  Support System: Spouse, Children  Complexity of Encounter: Low  Begin Time: 1030  End Time : 1045  Total Time Calculated: 15 min                             Spiritual Assessment/Intervention/Outcomes:    Assessment: Unable to assess    Intervention: Sustaining Presence/Ministry of presence    Outcome: Did not respond      Care Plan:    Plan and Referrals  Plan/Referrals: Continue Support (comment)          Spiritual Care Services   Electronically signed by Chaplain Vimal on 2/17/2025 at 11:50 AM.    To reach a  for emotional and spiritual support, place an EPIC consult request.   If a  is needed immediately, dial “0” and ask to page the on-call .

## 2025-02-18 LAB
ALBUMIN SERPL-MCNC: 2.6 G/DL (ref 3.5–4.6)
ALBUMIN SERPL-MCNC: 2.7 G/DL (ref 3.5–4.6)
ANION GAP SERPL CALCULATED.3IONS-SCNC: 10 MEQ/L (ref 9–15)
ANISOCYTOSIS BLD QL SMEAR: ABNORMAL
ANISOCYTOSIS BLD QL SMEAR: ABNORMAL
ANTI-XA UNFRAC HEPARIN: 0.2 IU/ML
ANTI-XA UNFRAC HEPARIN: 0.28 IU/ML
ANTI-XA UNFRAC HEPARIN: 0.38 IU/ML
BASOPHILS # BLD: 0 K/UL (ref 0–0.2)
BASOPHILS # BLD: 0 K/UL (ref 0–0.2)
BASOPHILS NFR BLD: 0.1 %
BASOPHILS NFR BLD: 0.2 %
BUN SERPL-MCNC: 68 MG/DL (ref 8–23)
BUN SERPL-MCNC: 69 MG/DL (ref 8–23)
BUN SERPL-MCNC: 70 MG/DL (ref 8–23)
BURR CELLS: ABNORMAL
BURR CELLS: ABNORMAL
CALCIUM SERPL-MCNC: 7 MG/DL (ref 8.5–9.9)
CALCIUM SERPL-MCNC: 7 MG/DL (ref 8.5–9.9)
CALCIUM SERPL-MCNC: 7.2 MG/DL (ref 8.5–9.9)
CHLORIDE SERPL-SCNC: 110 MEQ/L (ref 95–107)
CHLORIDE SERPL-SCNC: 111 MEQ/L (ref 95–107)
CHLORIDE SERPL-SCNC: 112 MEQ/L (ref 95–107)
CO2 SERPL-SCNC: 26 MEQ/L (ref 20–31)
CO2 SERPL-SCNC: 28 MEQ/L (ref 20–31)
CO2 SERPL-SCNC: 28 MEQ/L (ref 20–31)
CREAT SERPL-MCNC: 2.66 MG/DL (ref 0.7–1.2)
CREAT SERPL-MCNC: 2.83 MG/DL (ref 0.7–1.2)
CREAT SERPL-MCNC: 2.84 MG/DL (ref 0.7–1.2)
EOSINOPHIL # BLD: 0 K/UL (ref 0–0.7)
EOSINOPHIL # BLD: 0 K/UL (ref 0–0.7)
EOSINOPHIL NFR BLD: 0 %
EOSINOPHIL NFR BLD: 0 %
ERYTHROCYTE [DISTWIDTH] IN BLOOD BY AUTOMATED COUNT: 14.8 % (ref 11.5–14.5)
ERYTHROCYTE [DISTWIDTH] IN BLOOD BY AUTOMATED COUNT: 14.9 % (ref 11.5–14.5)
GLUCOSE BLD-MCNC: 101 MG/DL (ref 70–99)
GLUCOSE BLD-MCNC: 107 MG/DL (ref 70–99)
GLUCOSE BLD-MCNC: 112 MG/DL (ref 70–99)
GLUCOSE BLD-MCNC: 133 MG/DL (ref 70–99)
GLUCOSE BLD-MCNC: 38 MG/DL (ref 70–99)
GLUCOSE BLD-MCNC: 62 MG/DL (ref 70–99)
GLUCOSE BLD-MCNC: 73 MG/DL (ref 70–99)
GLUCOSE BLD-MCNC: 74 MG/DL (ref 70–99)
GLUCOSE BLD-MCNC: 88 MG/DL (ref 70–99)
GLUCOSE BLD-MCNC: 93 MG/DL (ref 70–99)
GLUCOSE SERPL-MCNC: 77 MG/DL (ref 70–99)
GLUCOSE SERPL-MCNC: 79 MG/DL (ref 70–99)
GLUCOSE SERPL-MCNC: 82 MG/DL (ref 70–99)
HCT VFR BLD AUTO: 26.4 % (ref 42–52)
HCT VFR BLD AUTO: 28.6 % (ref 42–52)
HGB BLD-MCNC: 8.7 G/DL (ref 14–18)
HGB BLD-MCNC: 9.1 G/DL (ref 14–18)
HYPOCHROMIA BLD QL SMEAR: ABNORMAL
HYPOCHROMIA BLD QL SMEAR: ABNORMAL
LYMPHOCYTES # BLD: 2.5 K/UL (ref 1–4.8)
LYMPHOCYTES # BLD: 5.2 K/UL (ref 1–4.8)
LYMPHOCYTES NFR BLD: 48 %
LYMPHOCYTES NFR BLD: 65 %
MAGNESIUM SERPL-MCNC: 2 MG/DL (ref 1.7–2.4)
MCH RBC QN AUTO: 29.8 PG (ref 27–31.3)
MCH RBC QN AUTO: 31.1 PG (ref 27–31.3)
MCHC RBC AUTO-ENTMCNC: 31.8 % (ref 33–37)
MCHC RBC AUTO-ENTMCNC: 33 % (ref 33–37)
MCV RBC AUTO: 93.8 FL (ref 79–92.2)
MCV RBC AUTO: 94.3 FL (ref 79–92.2)
MICROCYTES BLD QL SMEAR: ABNORMAL
MONOCYTES # BLD: 0.2 K/UL (ref 0.2–0.8)
MONOCYTES # BLD: 0.3 K/UL (ref 0.2–0.8)
MONOCYTES NFR BLD: 3.1 %
MONOCYTES NFR BLD: 5 %
MRSA, DNA, NASAL: ABNORMAL
NEUTROPHILS # BLD: 2.3 K/UL (ref 1.4–6.5)
NEUTROPHILS # BLD: 2.4 K/UL (ref 1.4–6.5)
NEUTS BAND NFR BLD MANUAL: 2 %
NEUTS SEG NFR BLD: 30 %
NEUTS SEG NFR BLD: 45 %
NEUTS VAC BLD QL SMEAR: ABNORMAL
NRBC BLD-RTO: 1 /100 WBC
OVALOCYTES BLD QL SMEAR: ABNORMAL
PATH INTERP BLD-IMP: YES
PERFORMED ON: ABNORMAL
PERFORMED ON: NORMAL
PHOSPHATE SERPL-MCNC: 2.9 MG/DL (ref 2.3–4.8)
PHOSPHATE SERPL-MCNC: 2.9 MG/DL (ref 2.3–4.8)
PLATELET # BLD AUTO: 126 K/UL (ref 130–400)
PLATELET # BLD AUTO: 99 K/UL (ref 130–400)
PLATELET BLD QL SMEAR: ABNORMAL
PLATELET BLD QL SMEAR: ABNORMAL
POIKILOCYTOSIS BLD QL SMEAR: ABNORMAL
POIKILOCYTOSIS BLD QL SMEAR: ABNORMAL
POTASSIUM SERPL-SCNC: 2.8 MEQ/L (ref 3.4–4.9)
POTASSIUM SERPL-SCNC: 3.5 MEQ/L (ref 3.4–4.9)
POTASSIUM SERPL-SCNC: 4 MEQ/L (ref 3.4–4.9)
RBC # BLD AUTO: 2.8 M/UL (ref 4.7–6.1)
RBC # BLD AUTO: 3.05 M/UL (ref 4.7–6.1)
S PNEUM AG UR QL: NEGATIVE
SLIDE REVIEW: ABNORMAL
SMUDGE CELLS BLD QL SMEAR: PRESENT
SODIUM SERPL-SCNC: 147 MEQ/L (ref 135–144)
SODIUM SERPL-SCNC: 148 MEQ/L (ref 135–144)
SODIUM SERPL-SCNC: 150 MEQ/L (ref 135–144)
SPECIMEN DESCRIPTION: ABNORMAL
TARGETS BLD QL SMEAR: ABNORMAL
TOXIC GRANULATION: ABNORMAL
TRIGL SERPL-MCNC: 295 MG/DL (ref 0–150)
VANCOMYCIN TROUGH SERPL-MCNC: 23.6 UG/ML (ref 10–20)
VARIANT LYMPHS NFR BLD: 2 %
WBC # BLD AUTO: 5.2 K/UL (ref 4.8–10.8)
WBC # BLD AUTO: 7.8 K/UL (ref 4.8–10.8)

## 2025-02-18 PROCEDURE — 87070 CULTURE OTHR SPECIMN AEROBIC: CPT

## 2025-02-18 PROCEDURE — 2580000003 HC RX 258: Performed by: INTERNAL MEDICINE

## 2025-02-18 PROCEDURE — 6370000000 HC RX 637 (ALT 250 FOR IP): Performed by: INTERNAL MEDICINE

## 2025-02-18 PROCEDURE — 83735 ASSAY OF MAGNESIUM: CPT

## 2025-02-18 PROCEDURE — 2000000000 HC ICU R&B

## 2025-02-18 PROCEDURE — 87186 SC STD MICRODIL/AGAR DIL: CPT

## 2025-02-18 PROCEDURE — 99291 CRITICAL CARE FIRST HOUR: CPT | Performed by: INTERNAL MEDICINE

## 2025-02-18 PROCEDURE — 6360000002 HC RX W HCPCS: Performed by: INTERNAL MEDICINE

## 2025-02-18 PROCEDURE — 80069 RENAL FUNCTION PANEL: CPT

## 2025-02-18 PROCEDURE — 85025 COMPLETE CBC W/AUTO DIFF WBC: CPT

## 2025-02-18 PROCEDURE — 84478 ASSAY OF TRIGLYCERIDES: CPT

## 2025-02-18 PROCEDURE — 94003 VENT MGMT INPAT SUBQ DAY: CPT

## 2025-02-18 PROCEDURE — 2700000000 HC OXYGEN THERAPY PER DAY

## 2025-02-18 PROCEDURE — 80202 ASSAY OF VANCOMYCIN: CPT

## 2025-02-18 PROCEDURE — 99232 SBSQ HOSP IP/OBS MODERATE 35: CPT | Performed by: INTERNAL MEDICINE

## 2025-02-18 PROCEDURE — 87077 CULTURE AEROBIC IDENTIFY: CPT

## 2025-02-18 PROCEDURE — 85520 HEPARIN ASSAY: CPT

## 2025-02-18 PROCEDURE — 93005 ELECTROCARDIOGRAM TRACING: CPT | Performed by: INTERNAL MEDICINE

## 2025-02-18 PROCEDURE — 2500000003 HC RX 250 WO HCPCS: Performed by: INTERNAL MEDICINE

## 2025-02-18 RX ORDER — INSULIN GLARGINE 100 [IU]/ML
15 INJECTION, SOLUTION SUBCUTANEOUS DAILY
Status: DISCONTINUED | OUTPATIENT
Start: 2025-02-19 | End: 2025-02-20 | Stop reason: HOSPADM

## 2025-02-18 RX ORDER — POTASSIUM CHLORIDE 7.45 MG/ML
10 INJECTION INTRAVENOUS
Status: DISCONTINUED | OUTPATIENT
Start: 2025-02-18 | End: 2025-02-18

## 2025-02-18 RX ORDER — POTASSIUM CHLORIDE 29.8 MG/ML
20 INJECTION INTRAVENOUS
Status: COMPLETED | OUTPATIENT
Start: 2025-02-18 | End: 2025-02-18

## 2025-02-18 RX ADMIN — INSULIN GLARGINE 25 UNITS: 100 INJECTION, SOLUTION SUBCUTANEOUS at 08:21

## 2025-02-18 RX ADMIN — DEXTROSE MONOHYDRATE 125 ML: 100 INJECTION, SOLUTION INTRAVENOUS at 17:03

## 2025-02-18 RX ADMIN — HEPARIN SODIUM 3000 UNITS: 1000 INJECTION INTRAVENOUS; SUBCUTANEOUS at 21:36

## 2025-02-18 RX ADMIN — SODIUM CHLORIDE, PRESERVATIVE FREE 40 MG: 5 INJECTION INTRAVENOUS at 20:28

## 2025-02-18 RX ADMIN — Medication 100 MCG/HR: at 02:28

## 2025-02-18 RX ADMIN — PROPOFOL 40.02 MCG/KG/MIN: 10 INJECTION, EMULSION INTRAVENOUS at 12:41

## 2025-02-18 RX ADMIN — CHLORHEXIDINE GLUCONATE, 0.12% ORAL RINSE 15 ML: 1.2 SOLUTION DENTAL at 20:28

## 2025-02-18 RX ADMIN — HEPARIN SODIUM 3000 UNITS: 1000 INJECTION INTRAVENOUS; SUBCUTANEOUS at 06:25

## 2025-02-18 RX ADMIN — CHLORHEXIDINE GLUCONATE, 0.12% ORAL RINSE 15 ML: 1.2 SOLUTION DENTAL at 08:20

## 2025-02-18 RX ADMIN — MEROPENEM 500 MG: 500 INJECTION INTRAVENOUS at 08:48

## 2025-02-18 RX ADMIN — FLUDROCORTISONE ACETATE 0.1 MG: 0.1 TABLET ORAL at 08:20

## 2025-02-18 RX ADMIN — POTASSIUM CHLORIDE 20 MEQ: 29.8 INJECTION, SOLUTION INTRAVENOUS at 05:21

## 2025-02-18 RX ADMIN — POTASSIUM CHLORIDE 20 MEQ: 29.8 INJECTION, SOLUTION INTRAVENOUS at 08:19

## 2025-02-18 RX ADMIN — POTASSIUM CHLORIDE 20 MEQ: 29.8 INJECTION, SOLUTION INTRAVENOUS at 06:22

## 2025-02-18 RX ADMIN — SODIUM CHLORIDE, PRESERVATIVE FREE 40 MG: 5 INJECTION INTRAVENOUS at 08:20

## 2025-02-18 RX ADMIN — POTASSIUM CHLORIDE 20 MEQ: 29.8 INJECTION, SOLUTION INTRAVENOUS at 04:25

## 2025-02-18 RX ADMIN — TACROLIMUS 1 MG: 1 CAPSULE ORAL at 08:21

## 2025-02-18 RX ADMIN — DEXTROSE MONOHYDRATE 125 ML: 100 INJECTION, SOLUTION INTRAVENOUS at 20:20

## 2025-02-18 RX ADMIN — PROPOFOL 25 MCG/KG/MIN: 10 INJECTION, EMULSION INTRAVENOUS at 02:29

## 2025-02-18 RX ADMIN — OSELTAMIVIR PHOSPHATE 30 MG: 6 POWDER, FOR SUSPENSION ORAL at 11:12

## 2025-02-18 RX ADMIN — MEROPENEM 500 MG: 500 INJECTION INTRAVENOUS at 20:28

## 2025-02-18 RX ADMIN — Medication 100 MCG/HR: at 16:01

## 2025-02-18 RX ADMIN — HEPARIN SODIUM 15 UNITS/KG/HR: 10000 INJECTION, SOLUTION INTRAVENOUS at 06:27

## 2025-02-18 RX ADMIN — HEPARIN SODIUM 15.04 UNITS/KG/HR: 10000 INJECTION, SOLUTION INTRAVENOUS at 08:43

## 2025-02-18 ASSESSMENT — PAIN SCALES - GENERAL
PAINLEVEL_OUTOF10: 0

## 2025-02-18 ASSESSMENT — PULMONARY FUNCTION TESTS
PIF_VALUE: 30
PIF_VALUE: 31
PIF_VALUE: 35
PIF_VALUE: 36
PIF_VALUE: 30

## 2025-02-18 NOTE — INTERDISCIPLINARY ROUNDS
Spiritual Care Services     Summary of Visit:   participated in ICU rounds. Patient intubated and no family were present.    Encounter Summary  Encounter Overview/Reason: Interdisciplinary rounds  Service Provided For: Patient  Referral/Consult From: Rounding  Support System: Spouse, Children  Complexity of Encounter: Low  Begin Time: 1030  End Time : 1045  Total Time Calculated: 15 min                               Spiritual Assessment/Intervention/Outcomes:    Assessment: Unable to assess    Intervention: Sustaining Presence/Ministry of presence    Outcome: Did not respond      Care Plan:    Plan and Referrals  Plan/Referrals: Continue Support (comment)     to provide additional spiritual support as needed or requested      Spiritual Care Services   Electronically signed by Chaplain Mai on 2/18/2025 at 10:44 AM.    To reach a  for emotional and spiritual support, place an EPIC consult request.   If a  is needed immediately, dial “0” and ask to page the on-call .

## 2025-02-19 VITALS
RESPIRATION RATE: 13 BRPM | WEIGHT: 167 LBS | DIASTOLIC BLOOD PRESSURE: 60 MMHG | HEART RATE: 64 BPM | HEIGHT: 67 IN | SYSTOLIC BLOOD PRESSURE: 131 MMHG | OXYGEN SATURATION: 97 % | TEMPERATURE: 99.3 F | BODY MASS INDEX: 26.21 KG/M2

## 2025-02-19 LAB
ALBUMIN SERPL-MCNC: 2.5 G/DL (ref 3.5–4.6)
ANION GAP SERPL CALCULATED.3IONS-SCNC: 10 MEQ/L (ref 9–15)
ANTI-XA UNFRAC HEPARIN: 0.29 IU/ML
ANTI-XA UNFRAC HEPARIN: 0.29 IU/ML
ANTI-XA UNFRAC HEPARIN: 0.34 IU/ML
ANTI-XA UNFRAC HEPARIN: 0.54 IU/ML
BACTERIA BLD CULT ORG #2: NORMAL
BACTERIA BLD CULT: NORMAL
BACTERIA SPEC RESP CULT: ABNORMAL
BACTERIA SPEC RESP CULT: ABNORMAL
BASE EXCESS ARTERIAL: 5 (ref -3–3)
BASOPHILS # BLD: 0 K/UL (ref 0–0.2)
BASOPHILS NFR BLD: 0.2 %
BUN SERPL-MCNC: 53 MG/DL (ref 8–23)
CALCIUM IONIZED: 1.13 MMOL/L (ref 1.12–1.32)
CALCIUM SERPL-MCNC: 7.3 MG/DL (ref 8.5–9.9)
CHLORIDE SERPL-SCNC: 112 MEQ/L (ref 95–107)
CO2 SERPL-SCNC: 29 MEQ/L (ref 20–31)
CREAT SERPL-MCNC: 2.01 MG/DL (ref 0.7–1.2)
EKG ATRIAL RATE: 56 BPM
EKG P AXIS: 31 DEGREES
EKG P-R INTERVAL: 154 MS
EKG Q-T INTERVAL: 456 MS
EKG QRS DURATION: 140 MS
EKG QTC CALCULATION (BAZETT): 440 MS
EKG R AXIS: 9 DEGREES
EKG T AXIS: 22 DEGREES
EKG VENTRICULAR RATE: 56 BPM
EOSINOPHIL # BLD: 0.1 K/UL (ref 0–0.7)
EOSINOPHIL NFR BLD: 1 %
ERYTHROCYTE [DISTWIDTH] IN BLOOD BY AUTOMATED COUNT: 14.7 % (ref 11.5–14.5)
GLUCOSE BLD-MCNC: 174 MG/DL (ref 70–99)
GLUCOSE BLD-MCNC: 182 MG/DL (ref 70–99)
GLUCOSE BLD-MCNC: 202 MG/DL (ref 70–99)
GLUCOSE BLD-MCNC: 202 MG/DL (ref 70–99)
GLUCOSE BLD-MCNC: 84 MG/DL (ref 70–99)
GLUCOSE BLD-MCNC: 88 MG/DL (ref 70–99)
GLUCOSE SERPL-MCNC: 92 MG/DL (ref 70–99)
HCO3 ARTERIAL: 30 MMOL/L (ref 21–29)
HCT VFR BLD AUTO: 26 % (ref 41–53)
HCT VFR BLD AUTO: 28.1 % (ref 42–52)
HGB BLD CALC-MCNC: 8.7 GM/DL (ref 13.5–17.5)
HGB BLD-MCNC: 9.2 G/DL (ref 14–18)
LACTATE: 0.67 MMOL/L (ref 0.4–2)
LYMPHOCYTES # BLD: 2.6 K/UL (ref 1–4.8)
LYMPHOCYTES NFR BLD: 44 %
MCH RBC QN AUTO: 31.2 PG (ref 27–31.3)
MCHC RBC AUTO-ENTMCNC: 32.7 % (ref 33–37)
MCV RBC AUTO: 95.3 FL (ref 79–92.2)
MONOCYTES # BLD: 0.5 K/UL (ref 0.2–0.8)
MONOCYTES NFR BLD: 7.6 %
NEUTROPHILS # BLD: 2.7 K/UL (ref 1.4–6.5)
NEUTS SEG NFR BLD: 47 %
O2 SAT, ARTERIAL: 93 % (ref 93–100)
ORGANISM: ABNORMAL
PATH INTERP BLD-IMP: NORMAL
PATH INTERP BLD-IMP: YES
PCO2 ARTERIAL: 50 MM HG (ref 35–45)
PERFORMED ON: ABNORMAL
PERFORMED ON: NORMAL
PERFORMED ON: NORMAL
PH ARTERIAL: 7.39 (ref 7.35–7.45)
PHOSPHATE SERPL-MCNC: 2.8 MG/DL (ref 2.3–4.8)
PLATELET # BLD AUTO: 103 K/UL (ref 130–400)
PLATELET BLD QL SMEAR: ABNORMAL
PO2 ARTERIAL: 67 MM HG (ref 75–108)
POC CHLORIDE: 105 MEQ/L (ref 99–110)
POC CREATININE: 1.7 MG/DL (ref 0.8–1.3)
POC FIO2: 30
POC SAMPLE TYPE: ABNORMAL
POTASSIUM SERPL-SCNC: 3.1 MEQ/L (ref 3.4–4.9)
POTASSIUM SERPL-SCNC: 3.5 MEQ/L (ref 3.5–5.1)
RBC # BLD AUTO: 2.95 M/UL (ref 4.7–6.1)
SLIDE REVIEW: ABNORMAL
SODIUM BLD-SCNC: 147 MEQ/L (ref 136–145)
SODIUM SERPL-SCNC: 151 MEQ/L (ref 135–144)
TCO2 ARTERIAL: 32 MMOL/L (ref 21–32)
VANCOMYCIN SERPL-MCNC: 15.7 UG/ML (ref 10–40)
VARIANT LYMPHS NFR BLD: 1 %
WBC # BLD AUTO: 5.8 K/UL (ref 4.8–10.8)

## 2025-02-19 PROCEDURE — 2580000003 HC RX 258: Performed by: INTERNAL MEDICINE

## 2025-02-19 PROCEDURE — 6370000000 HC RX 637 (ALT 250 FOR IP): Performed by: INTERNAL MEDICINE

## 2025-02-19 PROCEDURE — 36600 WITHDRAWAL OF ARTERIAL BLOOD: CPT

## 2025-02-19 PROCEDURE — 82803 BLOOD GASES ANY COMBINATION: CPT

## 2025-02-19 PROCEDURE — 82330 ASSAY OF CALCIUM: CPT

## 2025-02-19 PROCEDURE — 99291 CRITICAL CARE FIRST HOUR: CPT | Performed by: INTERNAL MEDICINE

## 2025-02-19 PROCEDURE — 82565 ASSAY OF CREATININE: CPT

## 2025-02-19 PROCEDURE — 2500000003 HC RX 250 WO HCPCS: Performed by: INTERNAL MEDICINE

## 2025-02-19 PROCEDURE — 6360000002 HC RX W HCPCS: Performed by: INTERNAL MEDICINE

## 2025-02-19 PROCEDURE — 84132 ASSAY OF SERUM POTASSIUM: CPT

## 2025-02-19 PROCEDURE — 85014 HEMATOCRIT: CPT

## 2025-02-19 PROCEDURE — 2580000003 HC RX 258: Performed by: NURSE PRACTITIONER

## 2025-02-19 PROCEDURE — 85025 COMPLETE CBC W/AUTO DIFF WBC: CPT

## 2025-02-19 PROCEDURE — 82948 REAGENT STRIP/BLOOD GLUCOSE: CPT

## 2025-02-19 PROCEDURE — 2000000000 HC ICU R&B

## 2025-02-19 PROCEDURE — 84295 ASSAY OF SERUM SODIUM: CPT

## 2025-02-19 PROCEDURE — 2500000003 HC RX 250 WO HCPCS: Performed by: NURSE PRACTITIONER

## 2025-02-19 PROCEDURE — 94660 CPAP INITIATION&MGMT: CPT

## 2025-02-19 PROCEDURE — 80202 ASSAY OF VANCOMYCIN: CPT

## 2025-02-19 PROCEDURE — 94003 VENT MGMT INPAT SUBQ DAY: CPT

## 2025-02-19 PROCEDURE — 83605 ASSAY OF LACTIC ACID: CPT

## 2025-02-19 PROCEDURE — 2700000000 HC OXYGEN THERAPY PER DAY

## 2025-02-19 PROCEDURE — 82435 ASSAY OF BLOOD CHLORIDE: CPT

## 2025-02-19 PROCEDURE — 99231 SBSQ HOSP IP/OBS SF/LOW 25: CPT | Performed by: NURSE PRACTITIONER

## 2025-02-19 PROCEDURE — 80069 RENAL FUNCTION PANEL: CPT

## 2025-02-19 PROCEDURE — 85520 HEPARIN ASSAY: CPT

## 2025-02-19 PROCEDURE — 6360000002 HC RX W HCPCS: Performed by: NURSE PRACTITIONER

## 2025-02-19 RX ORDER — POTASSIUM CHLORIDE 7.45 MG/ML
10 INJECTION INTRAVENOUS ONCE
Status: DISCONTINUED | OUTPATIENT
Start: 2025-02-19 | End: 2025-02-19

## 2025-02-19 RX ORDER — POTASSIUM CHLORIDE 29.8 MG/ML
20 INJECTION INTRAVENOUS
Status: COMPLETED | OUTPATIENT
Start: 2025-02-19 | End: 2025-02-19

## 2025-02-19 RX ORDER — VANCOMYCIN 1 G/200ML
1000 INJECTION, SOLUTION INTRAVENOUS ONCE
Status: DISCONTINUED | OUTPATIENT
Start: 2025-02-19 | End: 2025-02-19

## 2025-02-19 RX ADMIN — TACROLIMUS 1 MG: 1 CAPSULE ORAL at 08:38

## 2025-02-19 RX ADMIN — DEXTROSE MONOHYDRATE 500 ML: 50 INJECTION, SOLUTION INTRAVENOUS at 10:16

## 2025-02-19 RX ADMIN — INSULIN LISPRO 4 UNITS: 100 INJECTION, SOLUTION INTRAVENOUS; SUBCUTANEOUS at 14:37

## 2025-02-19 RX ADMIN — SODIUM CHLORIDE, PRESERVATIVE FREE 40 MG: 5 INJECTION INTRAVENOUS at 08:38

## 2025-02-19 RX ADMIN — MEROPENEM 500 MG: 500 INJECTION INTRAVENOUS at 08:41

## 2025-02-19 RX ADMIN — HEPARIN SODIUM 19 UNITS/KG/HR: 10000 INJECTION, SOLUTION INTRAVENOUS at 21:03

## 2025-02-19 RX ADMIN — MEROPENEM 1000 MG: 1 INJECTION INTRAVENOUS at 18:12

## 2025-02-19 RX ADMIN — PROPOFOL 30 MCG/KG/MIN: 10 INJECTION, EMULSION INTRAVENOUS at 00:15

## 2025-02-19 RX ADMIN — POTASSIUM CHLORIDE 20 MEQ: 29.8 INJECTION, SOLUTION INTRAVENOUS at 09:23

## 2025-02-19 RX ADMIN — HEPARIN SODIUM 17 UNITS/KG/HR: 10000 INJECTION, SOLUTION INTRAVENOUS at 03:42

## 2025-02-19 RX ADMIN — DEXMEDETOMIDINE 0.2 MCG/KG/HR: 100 INJECTION, SOLUTION INTRAVENOUS at 09:15

## 2025-02-19 RX ADMIN — DEXMEDETOMIDINE 0.8 MCG/KG/HR: 100 INJECTION, SOLUTION INTRAVENOUS at 15:54

## 2025-02-19 RX ADMIN — SODIUM CHLORIDE, PRESERVATIVE FREE 40 MG: 5 INJECTION INTRAVENOUS at 22:33

## 2025-02-19 RX ADMIN — VANCOMYCIN HYDROCHLORIDE 1000 MG: 1 INJECTION, POWDER, LYOPHILIZED, FOR SOLUTION INTRAVENOUS at 14:40

## 2025-02-19 RX ADMIN — CHLORHEXIDINE GLUCONATE, 0.12% ORAL RINSE 15 ML: 1.2 SOLUTION DENTAL at 08:38

## 2025-02-19 RX ADMIN — FLUDROCORTISONE ACETATE 0.1 MG: 0.1 TABLET ORAL at 08:38

## 2025-02-19 RX ADMIN — INSULIN LISPRO 4 UNITS: 100 INJECTION, SOLUTION INTRAVENOUS; SUBCUTANEOUS at 21:14

## 2025-02-19 RX ADMIN — PROPOFOL 30 MCG/KG/MIN: 10 INJECTION, EMULSION INTRAVENOUS at 05:53

## 2025-02-19 RX ADMIN — CHLORHEXIDINE GLUCONATE, 0.12% ORAL RINSE 15 ML: 1.2 SOLUTION DENTAL at 21:14

## 2025-02-19 RX ADMIN — Medication 100 MCG/HR: at 03:43

## 2025-02-19 RX ADMIN — POTASSIUM CHLORIDE 20 MEQ: 29.8 INJECTION, SOLUTION INTRAVENOUS at 10:30

## 2025-02-19 RX ADMIN — HEPARIN SODIUM 3000 UNITS: 1000 INJECTION INTRAVENOUS; SUBCUTANEOUS at 10:05

## 2025-02-19 RX ADMIN — Medication 75 MCG/HR: at 15:49

## 2025-02-19 RX ADMIN — HEPARIN SODIUM 3000 UNITS: 1000 INJECTION INTRAVENOUS; SUBCUTANEOUS at 22:33

## 2025-02-19 ASSESSMENT — PULMONARY FUNCTION TESTS
PIF_VALUE: 22
PIF_VALUE: 34
PIF_VALUE: 32
PIF_VALUE: 33
PIF_VALUE: 30

## 2025-02-19 ASSESSMENT — PAIN SCALES - GENERAL
PAINLEVEL_OUTOF10: 0

## 2025-02-19 NOTE — PLAN OF CARE
Problem: Discharge Planning  Goal: Discharge to home or other facility with appropriate resources  2/18/2025 0848 by Chaya Putnam RN  Outcome: Progressing  2/18/2025 0202 by Amanda Lezama RN  Outcome: Progressing  Flowsheets (Taken 2/17/2025 2000)  Discharge to home or other facility with appropriate resources: Identify barriers to discharge with patient and caregiver     Problem: Safety - Adult  Goal: Free from fall injury  2/18/2025 0848 by Chaya Putnam RN  Outcome: Progressing  2/18/2025 0202 by Amanda Lezama RN  Outcome: Progressing     Problem: Pain  Goal: Verbalizes/displays adequate comfort level or baseline comfort level  2/18/2025 0848 by Chaya Putnam RN  Outcome: Progressing  2/18/2025 0202 by Amanda Lezama RN  Outcome: Progressing  Flowsheets  Taken 2/18/2025 0000  Verbalizes/displays adequate comfort level or baseline comfort level: Assess pain using appropriate pain scale  Taken 2/17/2025 2000  Verbalizes/displays adequate comfort level or baseline comfort level: Assess pain using appropriate pain scale     Problem: Safety - Medical Restraint  Goal: Remains free of injury from restraints (Restraint for Interference with Medical Device)  Description: INTERVENTIONS:  1. Determine that other, less restrictive measures have been tried or would not be effective before applying the restraint  2. Evaluate the patient's condition at the time of restraint application  3. Inform patient/family regarding the reason for restraint  4. Q2H: Monitor safety, psychosocial status, comfort, nutrition and hydration  2/18/2025 0848 by Chaya Putnam RN  Outcome: Progressing  2/18/2025 0202 by Amanda Lezama RN  Outcome: Progressing     Problem: Skin/Tissue Integrity  Goal: Skin integrity remains intact  Description: 1.  Monitor for areas of redness and/or skin breakdown  2.  Assess vascular access sites hourly  3.  Every 4-6 hours minimum:  Change oxygen saturation probe site  4.  Every 4-6 hours:  If on 
  Problem: Discharge Planning  Goal: Discharge to home or other facility with appropriate resources  Outcome: Not Progressing  Flowsheets (Taken 2/14/2025 1600 by Tiffanie Palomo, RN)  Discharge to home or other facility with appropriate resources: Identify barriers to discharge with patient and caregiver     Problem: Safety - Adult  Goal: Free from fall injury  Outcome: Not Progressing     Problem: Pain  Goal: Verbalizes/displays adequate comfort level or baseline comfort level  Outcome: Not Progressing     Problem: Safety - Medical Restraint  Goal: Remains free of injury from restraints (Restraint for Interference with Medical Device)  Description: INTERVENTIONS:  1. Determine that other, less restrictive measures have been tried or would not be effective before applying the restraint  2. Evaluate the patient's condition at the time of restraint application  3. Inform patient/family regarding the reason for restraint  4. Q2H: Monitor safety, psychosocial status, comfort, nutrition and hydration  Outcome: Not Progressing     Problem: Skin/Tissue Integrity  Goal: Skin integrity remains intact  Description: 1.  Monitor for areas of redness and/or skin breakdown  2.  Assess vascular access sites hourly  3.  Every 4-6 hours minimum:  Change oxygen saturation probe site  4.  Every 4-6 hours:  If on nasal continuous positive airway pressure, respiratory therapy assess nares and determine need for appliance change or resting period  Outcome: Not Progressing     Problem: Discharge Planning  Goal: Discharge to home or other facility with appropriate resources  Outcome: Not Progressing  Flowsheets (Taken 2/14/2025 1600 by Tiffanie Palomo, RN)  Discharge to home or other facility with appropriate resources: Identify barriers to discharge with patient and caregiver     Problem: Safety - Adult  Goal: Free from fall injury  Outcome: Not Progressing     Problem: Pain  Goal: Verbalizes/displays adequate comfort 
  Problem: Discharge Planning  Goal: Discharge to home or other facility with appropriate resources  Outcome: Progressing     Problem: Safety - Adult  Goal: Free from fall injury  Outcome: Progressing     Problem: Pain  Goal: Verbalizes/displays adequate comfort level or baseline comfort level  Outcome: Progressing  Flowsheets (Taken 2/17/2025 0000 by Amanda Lezama, RN)  Verbalizes/displays adequate comfort level or baseline comfort level: Encourage patient to monitor pain and request assistance     Problem: Safety - Medical Restraint  Goal: Remains free of injury from restraints (Restraint for Interference with Medical Device)  Description: INTERVENTIONS:  1. Determine that other, less restrictive measures have been tried or would not be effective before applying the restraint  2. Evaluate the patient's condition at the time of restraint application  3. Inform patient/family regarding the reason for restraint  4. Q2H: Monitor safety, psychosocial status, comfort, nutrition and hydration  Outcome: Progressing     Problem: Skin/Tissue Integrity  Goal: Skin integrity remains intact  Description: 1.  Monitor for areas of redness and/or skin breakdown  2.  Assess vascular access sites hourly  3.  Every 4-6 hours minimum:  Change oxygen saturation probe site  4.  Every 4-6 hours:  If on nasal continuous positive airway pressure, respiratory therapy assess nares and determine need for appliance change or resting period  Outcome: Progressing     Problem: Nutrition Deficit:  Goal: Optimize nutritional status  Outcome: Progressing     Problem: Neurosensory - Adult  Goal: Achieves stable or improved neurological status  Outcome: Progressing     Problem: Respiratory - Adult  Goal: Achieves optimal ventilation and oxygenation  Outcome: Progressing     Problem: Cardiovascular - Adult  Goal: Maintains optimal cardiac output and hemodynamic stability  Outcome: Progressing  Goal: Absence of cardiac dysrhythmias or at 
  Problem: Discharge Planning  Goal: Discharge to home or other facility with appropriate resources  Outcome: Progressing  Flowsheets  Taken 2/16/2025 2014 by Amanda Lezama RN  Discharge to home or other facility with appropriate resources: Identify barriers to discharge with patient and caregiver  Taken 2/16/2025 1200 by Herminia Higgins RN  Discharge to home or other facility with appropriate resources: Identify barriers to discharge with patient and caregiver     Problem: Safety - Adult  Goal: Free from fall injury  Outcome: Progressing     Problem: Pain  Goal: Verbalizes/displays adequate comfort level or baseline comfort level  Outcome: Progressing  Flowsheets  Taken 2/16/2025 2000 by Amanda Lezama RN  Verbalizes/displays adequate comfort level or baseline comfort level: Encourage patient to monitor pain and request assistance  Taken 2/16/2025 0715 by Jacqui Matos RN  Verbalizes/displays adequate comfort level or baseline comfort level: Encourage patient to monitor pain and request assistance     Problem: Safety - Medical Restraint  Goal: Remains free of injury from restraints (Restraint for Interference with Medical Device)  Description: INTERVENTIONS:  1. Determine that other, less restrictive measures have been tried or would not be effective before applying the restraint  2. Evaluate the patient's condition at the time of restraint application  3. Inform patient/family regarding the reason for restraint  4. Q2H: Monitor safety, psychosocial status, comfort, nutrition and hydration  Outcome: Progressing  Flowsheets (Taken 2/16/2025 1200 by Herminia Higgins, RN)  Remains free of injury from restraints (restraint for interference with medical device): Determine that other, less restrictive measures have been tried or would not be effective before applying the restraint     Problem: Skin/Tissue Integrity  Goal: Skin integrity remains intact  Description: 1.  Monitor for areas of redness and/or skin 
  Problem: Discharge Planning  Goal: Discharge to home or other facility with appropriate resources  Outcome: Progressing  Flowsheets (Taken 2/16/2025 0000)  Discharge to home or other facility with appropriate resources: Identify barriers to discharge with patient and caregiver     Problem: Safety - Adult  Goal: Free from fall injury  Outcome: Progressing     Problem: Pain  Goal: Verbalizes/displays adequate comfort level or baseline comfort level  Outcome: Progressing  Flowsheets (Taken 2/16/2025 0000)  Verbalizes/displays adequate comfort level or baseline comfort level:   Encourage patient to monitor pain and request assistance   Assess pain using appropriate pain scale   Administer analgesics based on type and severity of pain and evaluate response     Problem: Safety - Medical Restraint  Goal: Remains free of injury from restraints (Restraint for Interference with Medical Device)  Description: INTERVENTIONS:  1. Determine that other, less restrictive measures have been tried or would not be effective before applying the restraint  2. Evaluate the patient's condition at the time of restraint application  3. Inform patient/family regarding the reason for restraint  4. Q2H: Monitor safety, psychosocial status, comfort, nutrition and hydration  Outcome: Progressing  Flowsheets (Taken 2/15/2025 1828 by Herminia Higgins, RN)  Remains free of injury from restraints (restraint for interference with medical device): Determine that other, less restrictive measures have been tried or would not be effective before applying the restraint     Problem: Skin/Tissue Integrity  Goal: Skin integrity remains intact  Description: 1.  Monitor for areas of redness and/or skin breakdown  2.  Assess vascular access sites hourly  3.  Every 4-6 hours minimum:  Change oxygen saturation probe site  4.  Every 4-6 hours:  If on nasal continuous positive airway pressure, respiratory therapy assess nares and determine need for appliance 
  Problem: Discharge Planning  Goal: Discharge to home or other facility with appropriate resources  Outcome: Progressing  Flowsheets (Taken 2/17/2025 2000)  Discharge to home or other facility with appropriate resources: Identify barriers to discharge with patient and caregiver     Problem: Safety - Adult  Goal: Free from fall injury  Outcome: Progressing     Problem: Pain  Goal: Verbalizes/displays adequate comfort level or baseline comfort level  Outcome: Progressing  Flowsheets  Taken 2/18/2025 0000  Verbalizes/displays adequate comfort level or baseline comfort level: Assess pain using appropriate pain scale  Taken 2/17/2025 2000  Verbalizes/displays adequate comfort level or baseline comfort level: Assess pain using appropriate pain scale     Problem: Safety - Medical Restraint  Goal: Remains free of injury from restraints (Restraint for Interference with Medical Device)  Description: INTERVENTIONS:  1. Determine that other, less restrictive measures have been tried or would not be effective before applying the restraint  2. Evaluate the patient's condition at the time of restraint application  3. Inform patient/family regarding the reason for restraint  4. Q2H: Monitor safety, psychosocial status, comfort, nutrition and hydration  Outcome: Progressing     Problem: Skin/Tissue Integrity  Goal: Skin integrity remains intact  Description: 1.  Monitor for areas of redness and/or skin breakdown  2.  Assess vascular access sites hourly  3.  Every 4-6 hours minimum:  Change oxygen saturation probe site  4.  Every 4-6 hours:  If on nasal continuous positive airway pressure, respiratory therapy assess nares and determine need for appliance change or resting period  Outcome: Progressing  Flowsheets (Taken 2/17/2025 2000)  Skin Integrity Remains Intact: Monitor for areas of redness and/or skin breakdown     Problem: Nutrition Deficit:  Goal: Optimize nutritional status  Outcome: Progressing     Problem: Neurosensory - 
Nutrition Problem #1: Inadequate oral intake  Intervention: Food and/or Nutrient Delivery: Continue NPO, Start Tube Feeding (Begin trophic TF when OK with Intensivist team  Peptide based TF (Vital 1.2) @ 20 ml/hr  50 ml water flush every 6 hrs while on IVF)      Problem: Discharge Planning  Goal: Discharge to home or other facility with appropriate resources  Recent Flowsheet Documentation  Taken 2/15/2025 0800 by Herminia Higgins, RN  Discharge to home or other facility with appropriate resources: Identify barriers to discharge with patient and caregiver  2/15/2025 0425 by Holly Owen, RN  Outcome: Not Progressing  Flowsheets (Taken 2/14/2025 1600 by Tiffanie Palomo, RN)  Discharge to home or other facility with appropriate resources: Identify barriers to discharge with patient and caregiver     Problem: Safety - Adult  Goal: Free from fall injury  2/15/2025 0425 by Holly Owen, RN  Outcome: Not Progressing     Problem: Pain  Goal: Verbalizes/displays adequate comfort level or baseline comfort level  2/15/2025 0425 by Holly Owen, RN  Outcome: Not Progressing  Flowsheets  Taken 2/15/2025 0200  Verbalizes/displays adequate comfort level or baseline comfort level:   Assess pain using appropriate pain scale   Administer analgesics based on type and severity of pain and evaluate response   Implement non-pharmacological measures as appropriate and evaluate response  Taken 2/14/2025 2200  Verbalizes/displays adequate comfort level or baseline comfort level:   Assess pain using appropriate pain scale   Administer analgesics based on type and severity of pain and evaluate response   Implement non-pharmacological measures as appropriate and evaluate response     Problem: Safety - Medical Restraint  Goal: Remains free of injury from restraints (Restraint for Interference with Medical Device)  Description: INTERVENTIONS:  1. Determine that other, less restrictive measures have been tried or would not be 
Nutrition Problem #1: Inadequate oral intake  Intervention: Food and/or Nutrient Delivery: Modify Tube Feeding  Nutritional      
returns to baseline bowel function: Assess bowel function  Goal: Maintains adequate nutritional intake  Outcome: Progressing  Flowsheets (Taken 2/18/2025 2000)  Maintains adequate nutritional intake: Monitor intake and output, weight and lab values     Problem: Genitourinary - Adult  Goal: Absence of urinary retention  Outcome: Progressing     Problem: Infection - Adult  Goal: Absence of infection at discharge  Outcome: Progressing  Flowsheets (Taken 2/18/2025 2000)  Absence of infection at discharge: Assess and monitor for signs and symptoms of infection     Problem: Metabolic/Fluid and Electrolytes - Adult  Goal: Electrolytes maintained within normal limits  Outcome: Progressing  Flowsheets (Taken 2/18/2025 2000)  Electrolytes maintained within normal limits: Monitor labs and assess patient for signs and symptoms of electrolyte imbalances  Goal: Hemodynamic stability and optimal renal function maintained  Outcome: Progressing  Flowsheets (Taken 2/18/2025 2000)  Hemodynamic stability and optimal renal function maintained: Monitor labs and assess for signs and symptoms of volume excess or deficit  Goal: Glucose maintained within prescribed range  Outcome: Progressing  Flowsheets (Taken 2/18/2025 2000)  Glucose maintained within prescribed range: Monitor blood glucose as ordered     Problem: Hematologic - Adult  Goal: Maintains hematologic stability  Outcome: Progressing  Flowsheets (Taken 2/18/2025 2000)  Maintains hematologic stability: Assess for signs and symptoms of bleeding or hemorrhage     Problem: Decision Making  Goal: Pt/Family able to effectively weigh alternatives and participate in decision making related to treatment and care  Description: INTERVENTIONS:  1. Determine when there are differences between patient's view, family's view, and healthcare provider's view of condition  2. Facilitate patient and family articulation of goals for care  3. Help patient and family identify pros/cons of alternative

## 2025-02-19 NOTE — CARE COORDINATION
0908- AM TEAM ICU ROUNDS. NO FAMILY PRESENT. ON VENTILATOR WITH SEDATION OF FENTANYL, PROPOFOL. ON IV HEPARIN AND LEVOPHED.  DISCHARGE PLAN IS TO TRANSFER TO [facility] WHEN BED AVAILABLE.   
Morning rounds completed with ICU team. Patient intubated and awaiting a bed at [facility].  
Patient admitted with pneumonia and influenza. Pneumonia booklet and zone pamphlet mailed to patient residence due to being in isolation at this time. Electronically signed by Balbina Rios RN on 2/14/2025 at 5:14 PM     
Team ICU rounds done this am outside of room. Per report patient is accepted to [facility] and we are awaiting a bed.   
use any at present. He is not on O2 or any resp equipment. PT has been to [facility] and other SNF's in the past and has been confused when he is sick but normally he is not confused wife states. He drives and does all his own ADL's at home. I gave wife HIPAA code and let her know she can check on him. I also reviewed IMM with her over phone and she verbalizes understanding of that. His plan is TBD and he will likely need PT/OT and poss O2 eval.     The Plan for Transition of Care is related to the following treatment goals of Shortness of breath [R06.02]  Hyperglycemia [R73.9]  Altered mental status, unspecified altered mental status type [R41.82]  Aspiration pneumonia of both lower lobes, unspecified aspiration pneumonia type (HCC) [J69.0]  Acute hypoxemic respiratory failure [J96.01]  Cardiomyopathy, unspecified type (HCC) [I42.9]  Sepsis with acute respiratory failure without septic shock, due to unspecified organism, unspecified whether hypoxia or hypercapnia present (HCC) [A41.9, R65.20, J96.00]  Nausea and vomiting, unspecified vomiting type [R11.2]    IF APPLICABLE: The Patient and/or patient representative Oli JARA and his family were provided with a choice of provider and agrees with the discharge plan. Freedom of choice list with basic dialogue that supports the patient's individualized plan of care/goals and shares the quality data associated with the providers was provided to:     Patient Representative Name:       The Patient and/or Patient Representative Agree with the Discharge Plan?      Amira NIÑO Lien  Case Management Department  Ph: [phone]

## 2025-02-20 LAB
BACTERIA SPEC RESP CULT: ABNORMAL
BACTERIA SPEC RESP CULT: ABNORMAL
ORGANISM: ABNORMAL
PATH INTERP BLD-IMP: NORMAL

## 2025-02-20 PROCEDURE — 2700000000 HC OXYGEN THERAPY PER DAY

## 2025-02-20 NOTE — PROGRESS NOTES
Nutrition Note    Tube feeding initiated 2/16/24, glucose has been < 100. Tube feeding modified to increase carbohydrate content,  May need to add protein modular to meet protein needs, will continue to monitor  Water flushes being managed by intensivist team for hypernatremia  Current Tube Feeding (TF) Orders:   Feeding Route: Orogastric  Formula: Standard with Fiber (Jevity 1.5)  Schedule: Continuous  Additives/Modulars: None  Water Flushes: 150 ml every 3 hrs ( 1200 ml)  Current TF & Flush Orders Provides: 720 kcals ( + propofol), 31 g protein, ~1560 ml free water @ goal of 45 = 1620 kcals ( + propofol) 69 g protein, ~ 2020 ml free water        Electronically signed by STAR MURPHY RD, LD on 2/19/25 at 7:56 AM EST        
  Critical Care Progress Note    2025 8:41 AM    Subjective:   Admit Date: 2025  PCP: Cornelio Barron MD    Chief Complaint   Patient presents with    Hyperglycemia     Interval History: Continues to be on Levophed.  Has been on heparin IV.  Sedated with propofol and fentanyl.  Currently on 40% FiO2 and PEEP of 5.  Sputum culture is growing Pseudomonas.  Currently on vancomycin and meropenem.    Medications:   Scheduled Meds:   tacrolimus  1 mg Per NG tube Daily    insulin lispro  0-16 Units SubCUTAneous 6 times per day    insulin glargine  25 Units SubCUTAneous Daily    chlorhexidine  15 mL Mouth/Throat BID    fludrocortisone  0.1 mg Oral Daily    hydrocortisone sodium succinate PF  50 mg IntraVENous Q8H    vancomycin (VANCOCIN) intermittent dosing (placeholder)   Other RX Placeholder    pantoprazole (PROTONIX) 40 mg in sodium chloride (PF) 0.9 % 10 mL injection  40 mg IntraVENous Q12H    oseltamivir 6mg/ml  30 mg Oral Daily    meropenem  500 mg IntraVENous Q12H    sodium bicarbonate  100 mEq IntraVENous Once     Continuous Infusions:   dextrose      heparin (PORCINE) Infusion 13 Units/kg/hr (25)    norepinephrine Stopped (25)    propofol 30 mcg/kg/min (25)    fentaNYL 125 mcg/hr (25)    sodium chloride           Objective:   Vitals:   Temp (24hrs), Av.3 °F (36.8 °C), Min:97.5 °F (36.4 °C), Max:99.7 °F (37.6 °C)    /74   Pulse 60   Temp 97.5 °F (36.4 °C) (Oral)   Resp 28   Ht 1.7 m (5' 6.93\")   Wt 79.8 kg (176 lb)   SpO2 99%   BMI 27.62 kg/m²   I/O:24HR INTAKE/OUTPUT:    Intake/Output Summary (Last 24 hours) at 2025 0841  Last data filed at 2025 0640  Gross per 24 hour   Intake 2170.23 ml   Output 1400 ml   Net 770.23 ml      0701 -  0700  In: 2335.2 [I.V.:730.9]  Out: 1400 [Urine:1400]  CVP:          Physical Exam:  General appearance -ill appearing  Mental status -intubated and sedated  Eyes - pupils equal and 
  Critical Care Progress Note    2025 8:43 AM    Subjective:   Admit Date: 2025  PCP: Cornelio Barron MD    Chief Complaint   Patient presents with    Hyperglycemia     Interval History: Lasted a bit over an hour on SBT yesterday.  Continues to be intubated.  Sedated with propofol and fentanyl.  Continues to be on IV heparin, Levophed is at 2 mcg.  Urine output is 1500 cc.    Medications:   Scheduled Meds:   tacrolimus  1 mg Per NG tube Daily    insulin lispro  0-16 Units SubCUTAneous 6 times per day    insulin glargine  25 Units SubCUTAneous Daily    chlorhexidine  15 mL Mouth/Throat BID    fludrocortisone  0.1 mg Oral Daily    vancomycin (VANCOCIN) intermittent dosing (placeholder)   Other RX Placeholder    pantoprazole (PROTONIX) 40 mg in sodium chloride (PF) 0.9 % 10 mL injection  40 mg IntraVENous Q12H    oseltamivir 6mg/ml  30 mg Oral Daily    meropenem  500 mg IntraVENous Q12H    sodium bicarbonate  100 mEq IntraVENous Once     Continuous Infusions:   dexmedeTOMIDine (PRECEDEX) 1,000 mcg in sodium chloride 0.9 % 250 mL infusion Stopped (25 1347)    dextrose      heparin (PORCINE) Infusion 15 Units/kg/hr (25)    norepinephrine 2 mcg/min (25)    propofol 15 mcg/kg/min (25)    fentaNYL 50 mcg/hr (25)    sodium chloride           Objective:   Vitals:   Temp (24hrs), Av °F (36.7 °C), Min:95.4 °F (35.2 °C), Max:99.1 °F (37.3 °C)    /64   Pulse 68   Temp 99 °F (37.2 °C)   Resp 28   Ht 1.7 m (5' 6.93\")   Wt 79.8 kg (176 lb)   SpO2 96%   BMI 27.62 kg/m²   I/O:24HR INTAKE/OUTPUT:    Intake/Output Summary (Last 24 hours) at 2025 0843  Last data filed at 2025 0831  Gross per 24 hour   Intake 2202.66 ml   Output 1500 ml   Net 702.66 ml      0701 -  0700  In: 1898.5 [I.V.:858.6]  Out: 1500 [Urine:1500]  CVP:          Physical Exam:  General appearance -ill appearing  Mental status -intubated and sedated  Eyes - pupils equal 
  Critical Care Progress Note    2025 8:46 AM    Subjective:   Admit Date: 2025  PCP: Cornelio Barron MD    Chief Complaint   Patient presents with    Hyperglycemia     Interval History: Lasted a bit over an hour on SBT yesterday.  Continues to be intubated.  Sedated with propofol and fentanyl.  Continues to be on IV heparin, Levophed is at 2 mcg.  Urine output is 1500 cc.    Medications:   Scheduled Meds:   [Held by provider] insulin glargine  15 Units SubCUTAneous Daily    tacrolimus  1 mg Per NG tube Daily    insulin lispro  0-16 Units SubCUTAneous 6 times per day    chlorhexidine  15 mL Mouth/Throat BID    fludrocortisone  0.1 mg Oral Daily    vancomycin (VANCOCIN) intermittent dosing (placeholder)   Other RX Placeholder    pantoprazole (PROTONIX) 40 mg in sodium chloride (PF) 0.9 % 10 mL injection  40 mg IntraVENous Q12H    meropenem  500 mg IntraVENous Q12H     Continuous Infusions:   dexmedeTOMIDine (PRECEDEX) 1,000 mcg in sodium chloride 0.9 % 250 mL infusion Stopped (25 1347)    dextrose      heparin (PORCINE) Infusion 17 Units/kg/hr (25)    norepinephrine 2 mcg/min (25)    propofol 20 mcg/kg/min (25)    fentaNYL 75 mcg/hr (25)    sodium chloride           Objective:   Vitals:   Temp (24hrs), Av.2 °F (36.8 °C), Min:97 °F (36.1 °C), Max:99.5 °F (37.5 °C)    /64   Pulse 65   Temp 99 °F (37.2 °C)   Resp 28   Ht 1.7 m (5' 6.93\")   Wt 75.8 kg (167 lb)   SpO2 98%   BMI 26.21 kg/m²   I/O:24HR INTAKE/OUTPUT:    Intake/Output Summary (Last 24 hours) at 2025 0846  Last data filed at 2025 0735  Gross per 24 hour   Intake 2536.24 ml   Output 3300 ml   Net -763.76 ml      0701 -  0700  In: 2801 [I.V.:1204]  Out: 3300 [Urine:3300]  CVP:          Physical Exam:  General appearance -ill appearing  Mental status -intubated and sedated  Eyes - pupils equal and reactive  Nose - normal and patent  Neck - supple, no significant 
  DATE OF CONSULTATION  2/18/2025    CONSULTANT  Dr. Camilo     REQUESTING PHYSICIAN  Boris Srinivasan MD     PRIMARY CARDIOLOGIST    REASON FOR CONSULTATION  Chief Complaint   Patient presents with    Hyperglycemia       Hospital Day: 4       Patient is a 64 y.o. male with Pmhx of alcoholic cirrhosis s/p liver transplant x3 (2004,2012,2018), CKD, Atrial fibrillation, H/o CVA, H/o substance use disorder, HTN, CAD, h/o DVT/PE, portal HTN, sarcoidosis, T2DM,  and h/o GI bleed who presents with a chief complaint of AMS. Patient is followed on a regular basis by Cornelio Hardwick MD.     Cardiology was consulted for elevated troponin.     All history was obtained via chart review and nursing report. Patient unable to answer questions. No family at bedside. On 3L O2 via NC.   Per ED note patient became altered and son called EMS. He was hypoxic on presentation.   Patient being worked up for sepsis-- possible aspiration pneumonia.   Per chart review in March 2023 echo showed preserved LV EF 66%, diastolic dysfunction. Last ischemic workup reported in 2018.     Troponin: 169,174  EKG: sinus tachycardia 134 bpm, RBBB  =============  Hospital course    2/18/2025  Patient remains intubated and sedated.  Respiratory therapy at bedside.  Telemetry shows sinus rhythm 70 bpm, PACs, PVCs  Information was obtained per chart review and nursing report.  Patient accepted to [facility], to be transferred once bed is available.      2/17/2025  Patient remains intubated and sedated  Not on pressors  Telemetry sinus     2/16/2025  Patient remains intubated  On norepinephrine  On heparin drip    2/15/2025  Patient remains intubated and sedated  On Levophed  Sinus rhythm on telemetry      No past medical history on file.   Patient Active Problem List   Diagnosis    Pneumonia of both lower lobes due to infectious organism    GIB (gastrointestinal bleeding)    ZAIN (acute kidney injury) (HCC)    Acute hypoxemic respiratory failure 
  DATE OF CONSULTATION  2/18/2025    CONSULTANT  Dr. Camilo     REQUESTING PHYSICIAN  Boris Srinivasan MD     PRIMARY CARDIOLOGIST    REASON FOR CONSULTATION  Chief Complaint   Patient presents with    Hyperglycemia       Hospital Day: 4       Patient is a 64 y.o. male with Pmhx of alcoholic cirrhosis s/p liver transplant x3 (2004,2012,2018), CKD, Atrial fibrillation, H/o CVA, H/o substance use disorder, HTN, CAD, h/o DVT/PE, portal HTN, sarcoidosis, T2DM,  and h/o GI bleed who presents with a chief complaint of AMS. Patient is followed on a regular basis by Cornelio Hardwick MD.     Cardiology was consulted for elevated troponin.     All history was obtained via chart review and nursing report. Patient unable to answer questions. No family at bedside. On 3L O2 via NC.   Per ED note patient became altered and son called EMS. He was hypoxic on presentation.   Patient being worked up for sepsis-- possible aspiration pneumonia.   Per chart review in March 2023 echo showed preserved LV EF 66%, diastolic dysfunction. Last ischemic workup reported in 2018.     Troponin: 169,174  EKG: sinus tachycardia 134 bpm, RBBB  =============  Hospital course  2/18/2025  Patient remains intubated and sedated  Not on pressors  Telemetry sinus rhythm    2/16/2025  Patient remains intubated  On norepinephrine  On heparin drip    2/15/2025  Patient remains intubated and sedated  On Levophed  Sinus rhythm on telemetry      No past medical history on file.   Patient Active Problem List   Diagnosis    Pneumonia of both lower lobes due to infectious organism    GIB (gastrointestinal bleeding)    ZAIN (acute kidney injury) (HCC)    Acute hypoxemic respiratory failure    Altered mental status    Cardiomyopathy (HCC)    Influenza and pneumonia    UTI (urinary tract infection) due to Enterococcus       No past surgical history on file.    Social History     Socioeconomic History    Marital status:    Tobacco Use    Smoking status: 
  DATE OF CONSULTATION  2/19/2025    CONSULTANT  Dr. Camilo     REQUESTING PHYSICIAN  Boris Srinivasan MD     PRIMARY CARDIOLOGIST    REASON FOR CONSULTATION  Chief Complaint   Patient presents with    Hyperglycemia       Hospital Day: 5       Patient is a 64 y.o. male with Pmhx of alcoholic cirrhosis s/p liver transplant x3 (2004,2012,2018), CKD, Atrial fibrillation, H/o CVA, H/o substance use disorder, HTN, CAD, h/o DVT/PE, portal HTN, sarcoidosis, T2DM,  and h/o GI bleed who presents with a chief complaint of AMS. Patient is followed on a regular basis by Cornelio Hardwick MD.     Cardiology was consulted for elevated troponin.     All history was obtained via chart review and nursing report. Patient unable to answer questions. No family at bedside. On 3L O2 via NC.   Per ED note patient became altered and son called EMS. He was hypoxic on presentation.   Patient being worked up for sepsis-- possible aspiration pneumonia.   Per chart review in March 2023 echo showed preserved LV EF 66%, diastolic dysfunction. Last ischemic workup reported in 2018.     Troponin: 169,174  EKG: sinus tachycardia 134 bpm, RBBB  =============  Hospital course  2/19/2025  Patient remains intubated/sedated at this time  Tele showing SR rate in 70s, several less than 3 second pauses noted, PAC/PVC  Info obtained from chart and bedside nurse   Not on pressors    2/18/2025  Patient remains intubated and sedated.  Respiratory therapy at bedside.  Telemetry shows sinus rhythm 70 bpm, PACs, PVCs  Information was obtained per chart review and nursing report.  Patient accepted to [facility], to be transferred once bed is available.      2/17/2025  Patient remains intubated and sedated  Not on pressors  Telemetry sinus     2/16/2025  Patient remains intubated  On norepinephrine  On heparin drip    2/15/2025  Patient remains intubated and sedated  On Levophed  Sinus rhythm on telemetry      No past medical history on file.   Patient 
  DATE OF CONSULTATION  2025    CONSULTANT  Dr. Camilo     REQUESTING PHYSICIAN  Sheila Walker MD     PRIMARY CARDIOLOGIST    REASON FOR CONSULTATION  Chief Complaint   Patient presents with    Hyperglycemia       Hospital Day: 2       Patient is a 64 y.o. male with Pmhx of alcoholic cirrhosis s/p liver transplant x3 (,,), CKD, Atrial fibrillation, H/o CVA, H/o substance use disorder, HTN, CAD, h/o DVT/PE, portal HTN, sarcoidosis, T2DM,  and h/o GI bleed who presents with a chief complaint of AMS. Patient is followed on a regular basis by Cornelio Hardwick MD.     Cardiology was consulted for elevated troponin.     All history was obtained via chart review and nursing report. Patient unable to answer questions. No family at bedside. On 3L O2 via NC.   Per ED note patient became altered and son called EMS. He was hypoxic on presentation.   Patient being worked up for sepsis-- possible aspiration pneumonia.   Per chart review in 2023 echo showed preserved LV EF 66%, diastolic dysfunction. Last ischemic workup reported in 2018.     Troponin: 169,174  EKG: sinus tachycardia 134 bpm, RBBB  =============  Hospital course  2025  Patient remains intubated  On norepinephrine  On heparin drip    2/15/2025  Patient remains intubated and sedated  On Levophed  Sinus rhythm on telemetry      No past medical history on file.   Patient Active Problem List   Diagnosis    Pneumonia of both lower lobes due to infectious organism    GIB (gastrointestinal bleeding)    ZAIN (acute kidney injury) (HCC)    Acute hypoxemic respiratory failure    Altered mental status    Cardiomyopathy (HCC)    Influenza and pneumonia       No past surgical history on file.    Social History     Socioeconomic History    Marital status:    Tobacco Use    Smoking status: Former     Current packs/day: 0.00     Types: Cigarettes     Quit date:      Years since quittin.1    Smokeless tobacco: Never   Substance and 
  Hospitalist Daily Progress Note  Name: Oli Landaverde  Age: 64 y.o.  Gender: male  CodeStatus: Full Code  Allergies: No Known Allergies    Chief Complaint:Hyperglycemia      Primary Care Provider: Cornelio Barron MD    InpatientTreatment Team: Treatment Team:   Boris Srinivasan MD Bescak, George M, DO Zaizafoun, Manaf, MD Hipp, Lisa, Lachelle Mallory MD Cortes, Manuel, MD Murry, Paul J, MD West, Robin M, RN Bezares, Amanda, RN Murphy, Amanda Cummins RN    Admission Date: 2/14/2025      Subjective: Intubated, sedated.    Physical Exam  Vitals and nursing note reviewed.   Constitutional:       Comments: Intubated, sedated.   Cardiovascular:      Rate and Rhythm: Normal rate and regular rhythm.   Pulmonary:      Effort: Pulmonary effort is normal.      Breath sounds: Normal breath sounds.   Abdominal:      General: Bowel sounds are normal.      Palpations: Abdomen is soft.   Skin:     General: Skin is warm and dry.   Neurological:      Comments: Intubated, sedated.         Medications:  Reviewed    Infusion Medications:    dexmedeTOMIDine (PRECEDEX) 400 mcg in sodium chloride 0.9 % 100 mL infusion Stopped (02/17/25 1347)    dextrose      heparin (PORCINE) Infusion 13 Units/kg/hr (02/17/25 1805)    norepinephrine Stopped (02/17/25 1513)    propofol 25 mcg/kg/min (02/17/25 1805)    fentaNYL 50 mcg/hr (02/17/25 1805)    sodium chloride       Scheduled Medications:    tacrolimus  1 mg Per NG tube Daily    insulin lispro  0-16 Units SubCUTAneous 6 times per day    insulin glargine  25 Units SubCUTAneous Daily    chlorhexidine  15 mL Mouth/Throat BID    fludrocortisone  0.1 mg Oral Daily    vancomycin (VANCOCIN) intermittent dosing (placeholder)   Other RX Placeholder    pantoprazole (PROTONIX) 40 mg in sodium chloride (PF) 0.9 % 10 mL injection  40 mg IntraVENous Q12H    oseltamivir 6mg/ml  30 mg Oral Daily    meropenem  500 mg IntraVENous Q12H    sodium bicarbonate  100 mEq IntraVENous Once 
  Hospitalist Daily Progress Note  Name: Oli Landaverde  Age: 64 y.o.  Gender: male  CodeStatus: Full Code  Allergies: No Known Allergies    Chief Complaint:Hyperglycemia      Primary Care Provider: Cornelio Barron MD    InpatientTreatment Team: Treatment Team:   Boris Srinivasan MD Bescak, George M, DO Zaizafoun, Manaf, MD Hipp, Lisa, RN Cortes, Manuel, MD Murry, Paul J, MD West, JIHAN Barfield Nicole Alyssa Bennett, Kathryn A, RN Young, Allison M, RN    Admission Date: 2/14/2025      Subjective: Intubated, sedated.    Physical Exam  Vitals and nursing note reviewed.   Constitutional:       Comments: Intubated, sedated.   Cardiovascular:      Rate and Rhythm: Normal rate and regular rhythm.   Pulmonary:      Effort: Pulmonary effort is normal.      Breath sounds: Normal breath sounds.   Abdominal:      General: Bowel sounds are normal.      Palpations: Abdomen is soft.   Skin:     General: Skin is warm and dry.   Neurological:      Comments: Intubated, sedated.         Medications:  Reviewed    Infusion Medications:    dexmedeTOMIDine (PRECEDEX) 1,000 mcg in sodium chloride 0.9 % 250 mL infusion 0.8 mcg/kg/hr (02/19/25 1828)    dextrose      heparin (PORCINE) Infusion 19 Units/kg/hr (02/19/25 1828)    norepinephrine Stopped (02/19/25 1031)    propofol Stopped (02/19/25 1516)    fentaNYL 75 mcg/hr (02/19/25 1828)    sodium chloride       Scheduled Medications:    meropenem  1,000 mg IntraVENous Q12H    [Held by provider] insulin glargine  15 Units SubCUTAneous Daily    tacrolimus  1 mg Per NG tube Daily    insulin lispro  0-16 Units SubCUTAneous 6 times per day    chlorhexidine  15 mL Mouth/Throat BID    fludrocortisone  0.1 mg Oral Daily    vancomycin (VANCOCIN) intermittent dosing (placeholder)   Other RX Placeholder    pantoprazole (PROTONIX) 40 mg in sodium chloride (PF) 0.9 % 10 mL injection  40 mg IntraVENous Q12H     PRN Meds: glucose, dextrose bolus **OR** dextrose bolus, glucagon 
  Hospitalist Daily Progress Note  Name: Oli Lnadaverde  Age: 64 y.o.  Gender: male  CodeStatus: Full Code  Allergies: No Known Allergies    Chief Complaint:Hyperglycemia      Primary Care Provider: Cornelio Barron MD    InpatientTreatment Team: Treatment Team:   Boris Srinivasan MD Bescak, George M, DO Zaizafoun, Manaf, MD Hipp, Lisa, Lachelle Mallory MD Cortes, Manuel, MD Murry, Paul J, MD West, JIHAN Barfield Mary Jo C Bezares, Amanda, RN Barens, Nicole Alyssa Garnica, Maria, RN    Admission Date: 2/14/2025      Subjective: Intubated, sedated.    Physical Exam  Vitals and nursing note reviewed.   Constitutional:       Comments: Intubated, sedated.   Cardiovascular:      Rate and Rhythm: Normal rate and regular rhythm.   Pulmonary:      Effort: Pulmonary effort is normal.      Breath sounds: Normal breath sounds.   Abdominal:      General: Bowel sounds are normal.      Palpations: Abdomen is soft.   Skin:     General: Skin is warm and dry.   Neurological:      Comments: Intubated, sedated.         Medications:  Reviewed    Infusion Medications:    dexmedeTOMIDine (PRECEDEX) 1,000 mcg in sodium chloride 0.9 % 250 mL infusion Stopped (02/17/25 1347)    dextrose      heparin (PORCINE) Infusion 15.04 Units/kg/hr (02/18/25 0843)    norepinephrine 2 mcg/min (02/18/25 0831)    propofol 15 mcg/kg/min (02/18/25 0831)    fentaNYL 50 mcg/hr (02/18/25 0831)    sodium chloride       Scheduled Medications:    [START ON 2/19/2025] insulin glargine  15 Units SubCUTAneous Daily    tacrolimus  1 mg Per NG tube Daily    insulin lispro  0-16 Units SubCUTAneous 6 times per day    chlorhexidine  15 mL Mouth/Throat BID    fludrocortisone  0.1 mg Oral Daily    vancomycin (VANCOCIN) intermittent dosing (placeholder)   Other RX Placeholder    pantoprazole (PROTONIX) 40 mg in sodium chloride (PF) 0.9 % 10 mL injection  40 mg IntraVENous Q12H    meropenem  500 mg IntraVENous Q12H     PRN Meds: glucose, 
  Pharmacy Vancomycin Consult     Vancomycin Day: 3  Current Dosing: Dosing on levels    Temp max: 97.8    Recent Labs     02/17/25  1437 02/17/25 2035   BUN 75* 72*       Recent Labs     02/17/25  1437 02/17/25 2035   CREATININE 3.33* 3.11*       Recent Labs     02/16/25  0629 02/17/25  0444   WBC 8.9 4.9         Intake/Output Summary (Last 24 hours) at 2/17/2025 2133  Last data filed at 2/17/2025 1805  Gross per 24 hour   Intake 1849.87 ml   Output 1800 ml   Net 49.87 ml       Ht Readings from Last 1 Encounters:   02/15/25 1.7 m (5' 6.93\")        Wt Readings from Last 1 Encounters:   02/17/25 79.8 kg (176 lb)         Body mass index is 27.62 kg/m².    Estimated Creatinine Clearance: 24 mL/min (A) (based on SCr of 3.11 mg/dL (H)).    Trough:   Lab Results   Component Value Date/Time    BRENDA 18.7 02/17/2025 08:35 PM       Assessment/Plan:  Vancomycin trough above goal of <15. New vancomycin trough ordered in 12 hours for 2/18 at 0900. No vancomycin dose at this time.    Jaycee Larson, RuiD [facility] 2/17/2025 9:37 PM    
  Physician Progress Note      PATIENT:               JOANN LO  [facility] #:                  [id number]  :                       1960  ADMIT DATE:       2025 6:01 AM  DISCH DATE:  RESPONDING  PROVIDER #:        Boris Srinivasan MD          QUERY TEXT:    Patient admitted with Sepsis, Influenza A, UTI and bibasilar pneumonia -   community-acquired pneumonia versus aspiration.  Noted to also have urine   culture positive for enterococcus and sputum positive for Pseudomonas. If   possible, please document in progress notes and discharge summary the source   of sepsis:    The medical record reflects the following:  Risk Factors: Sepsis, Influenza A, UTI and bibasilar pneumonia -   community-acquired pneumonia versus aspiration.  Clinical Indicators: Temp 102.1-103.2, -131, RR 22-38, WBC 7.6-11.8,   Procalcitonin 3.6, Lactic acid 3.5; CXR: Cardiomegaly with bibasilar   infiltrates and small bilateral pleural effusions, aspiration is of concern  Treatment: ICU care, Tamiflu, IV Levophed, Intubation with MV, IVFB 2L, IV   Zosyn, IV Vanco, IV Merrem, BC, Resp cx, UA/UC, labs and monitoring    Thank you,  Kaci Mota   Clinical Documentation Improvement Specialist  W: [phone]  Options provided:  -- Sepsis, present on admission due to Influenza A, Enterococcus UTI and   bibasilar aspiration pneumonia due to pseudomonas  -- Sepsis, present on admission due to Influenza A  -- Sepsis, present on admission due to Enterococcus UTI  -- Sepsis, present on admission due to bibasilar aspiration pneumonia due to   pseudomonas  -- Other - I will add my own diagnosis  -- Disagree - Not applicable / Not valid  -- Disagree - Clinically unable to determine / Unknown  -- Refer to Clinical Documentation Reviewer    PROVIDER RESPONSE TEXT:    This patient has Sepsis, present on admission due to Influenza A, Enterococcus   UTI and bibasilar aspiration pneumonia due to pseudomonas    Query created by: Nakul 
2300  Patient transferred to [facility] by [facility] critical care ground transport .  Family notified with room and phone number   
Comprehensive Nutrition Assessment    Type and Reason for Visit:  Initial, Consult (TF order and manage)    Nutrition Recommendations/Plan:   Begin trophic TF when OK with Intensivist team   Peptide based TF (Vital 1.2) @ 20 ml/hr   50 ml water flush every 6 hrs while on IVF      Malnutrition Assessment:  Malnutrition Status:  No malnutrition (02/15/25 1104)    Context:  Chronic Illness     Findings of the 6 clinical characteristics of malnutrition:  Energy Intake:  Unable to assess  Weight Loss:  Mild weight loss     Body Fat Loss:  Unable to assess     Muscle Mass Loss:  Unable to assess    Fluid Accumulation:  No fluid accumulation     Strength:  Not Performed    Nutrition Assessment:    Inadequate oral intake due to intubation. Pt had coffee ground emesis  in ER, none since that time.  Per GI, plan EGD once medically stable.  Begin trophic TF when OK with Intensivist team    Nutrition Related Findings:    PMH: alcoholic liver cirrhosis, Liver transplant x 3 (2004, 2012, 2018), DM,  heart failure, lupus, sarcoidosis, and portal hypertension. Presents with altered mental status,  Intubated 2/14. Dark colored coffee ground emesis in ER, none since, has not had any melanotic stool. has no signs of any ongoing bleed, Per GI: \" possible etiology includes esophagitis ulcer disease, I doubt patient has esophageal varices since his platelet count is normal and has no evidence of portal hypertension.\"  BM 2/15.  Labs: elevated BUN/CR, NH3 WN, OGT in placeL, glucose < 140.  Meds include octreotide.  IVF: NaCl @ 60 m/hr, levo @ 16.9 ml/hr, fentenyl 10 ml/hr,  no propofol, no edema noted Wound Type: None       Current Nutrition Intake & Therapies:    Diet NPO Exceptions are: Sips of Water with Meds    Anthropometric Measures:  Height: 170 cm (5' 6.93\")  Ideal Body Weight (IBW): 148 lbs (67 kg)    Admission Body Weight: 75.8 kg (167 lb) (bed scale)  Current Body Weight: 75.8 kg (167 lb),Weight Source: Not 
Comprehensive Nutrition Assessment    Type and Reason for Visit:  Reassess    Nutrition Recommendations/Plan:   TF changed to standard with fiber ( Jevity 1.5) and begin progression to goal rate of 45 ml/hr  Water flushes increased to 200 ml every 2 hrs by provider for management of hypernatremia  Monitor for changes in propofol rate for adjustments to EN goal rate       Malnutrition Assessment:  Malnutrition Status:  No malnutrition (02/15/25 1104)    Context:  Chronic Illness     Findings of the 6 clinical characteristics of malnutrition:  Energy Intake:  Unable to assess  Weight Loss:  Mild weight loss     Body Fat Loss:  Unable to assess     Muscle Mass Loss:  Unable to assess    Fluid Accumulation:  No fluid accumulation     Strength:  Not Performed    Nutrition Assessment:    Pt has tolerated intiation of tube feeding, glucose remains below target range of 140-180, will modify tube feeding to increase carbohydrate content, may need to add protein modular to meet needs, if pt remains intubated, will continue to monitor    Nutrition Related Findings:    PMH: alcoholic liver cirrhosis, Liver transplant x 3 (2004, 2012, 2018), DM, heart failure, lupus, sarcoidosis, and portal hypertension. Intubated (2/14.) OG placed, TF started ( 2/16),  Currently sedated with low dose propofol ( < 120 kcals), low lose levo, No IVF,  + BM's ; BM 2/15. Abnormal labs noted ( AElevated Na, low K, glus < 100). Wound Type: None       Current Nutrition Intake & Therapies:    Average Meal Intake: NPO  Average Supplements Intake: NPO  Diet NPO Exceptions are: Sips of Water with Meds  ADULT TUBE FEEDING; Orogastric; Standard with Fiber; Continuous; 20; Yes; 10; Q 4 hours; 45; 200; Other (specify); every two hours  Current Tube Feeding (TF) Orders:  Feeding Route: Orogastric  Formula: Standard with Fiber (Jevity 1.5)  Schedule: Continuous  Feeding Regimen: 20 ml/hr = 480 ml/ goal 45 ml/hr ( 1080 ml)  Additives/Modulars: None  Water 
Failed SBT after 25 mins on cpap due to increased heart rate and restlessness and agitation.   Electronically signed by Jacqui Matos RN on 2/16/2025 at 8:11 AM     
Gastroenterology Progress Note    Oli Landaverde is a 64 y.o. male patient.  Hospitalization Day:5    Chief C/O: coffee ground emesis    SUBJECTIVE: Seen and examined in the intensive care unit, critically ill, intubated with failure to wean, sedated, on IV heparin infusion, vasopressors as needed, hemoglobin 8.7, no overt bleeding.    ROS:  Gastrointestinal ROS: deferred    Physical    VITALS:  BP (!) 107/47   Pulse 74   Temp 99.3 °F (37.4 °C)   Resp 27   Ht 1.7 m (5' 6.93\")   Wt 75.8 kg (167 lb)   SpO2 93%   BMI 26.21 kg/m²   TEMPERATURE:  Current - Temp: 99.3 °F (37.4 °C); Max - Temp  Av.3 °F (36.8 °C)  Min: 97 °F (36.1 °C)  Max: 99.5 °F (37.5 °C)    General: Critically ill  Skin- without jaundice  Eyes: anicteric sclera  Cardiac: RRR, Nl s1s2, without murmurs  Lungs intubated and sedated, CTA Bilaterally  Abdomen soft, ND, NT, no HSM, Bowel sounds normal, NG with tube feed  Ext: without edema  Neuro: Sedated    Data    Data Review:    Recent Labs     25  2342 25  0536 25  0614 25  1204   WBC 5.2 7.8 5.8  --    HGB 8.7* 9.1* 9.2* 8.7*   HCT 26.4* 28.6* 28.1*  --    MCV 94.3* 93.8* 95.3*  --    PLT 99* 126* 103*  --      Recent Labs     25  0314 25  0536 25  0755 25  0614 25  1204   * 150* 147* 151*  --    K 2.8* 3.5 4.0 3.1*  --    * 112* 111* 112*  --    CO2 28 28 26 29  --    PHOS 2.9  --  2.9 2.8  --    BUN 70* 68* 69* 53*  --    CREATININE 2.84* 2.83* 2.66* 2.01* 1.7*     Recent Labs     25  0444   AST 42*   ALT 28   BILITOT 0.3   ALKPHOS 54     No results for input(s): \"LIPASE\", \"AMYLASE\" in the last 72 hours.  Recent Labs     25  0444   PROTIME 15.4*   INR 1.2         ASSESSMENT:  Dr Burroughs consult 25  64-year-old male status post liver transplantation in the past admitted with change in mental status, uncontrolled type, influenza A and possible sepsis, I am told patient had 1 episode of coffee-ground 
I called his wife Dina to give her update on his condition, status and tx plan.    His son Boris answered the phone.  Boris stated that his mom passed out and was taken to ER by EMS.    Boris stated that his dad has been sick since he was discharged from [facility] few weeks ago  Boris stated that his dad left [facility] AMA.  Boris stated that he should have never left. He has been very sick for the last 3 weeks.  Boris stated that he has been pressuring his dad to go back to the emergency room for 3 weeks but never listened to him.      Boris stated that given his dad reluctancy to seek medical care, he is not sure if he would like aggressive medical care.   Continue full code at this time.      I informed him that I may need to transfer him to [facility].  He does not have any objection that but not really asking for it either.  
I received a call from ICU attending Dr. Patel.    I gave her report on patient condition  .  He accepted patient to be transferred to ICU today    He instructed me to give him the Prograf today and they will check his level when he gets there.    Patient will be transferred when bed opens up at [facility] ICU to be seen by ICU team as well as transplant team.    I called his wife to update her and let her know that Oli will be transferred. She did not answer the phone.    Subsequently I called his son Boris and informed him that is that he will be transferring to [facility] when a bed is available.    Boris is very appreciative of the care that his dad is receiving here at [facility].      
Infectious Disease     Patient Name: Oli Landaverde  Date: 2/16/2025  YOB: 1960  Medical Record Number: [id number]              History of Present Illness:          Laënnec cirrhosis s/p orthotopic liver transplant in 2004 c/b anastomotic stricture, biliary stricture just distal to right and left hepatic duct bifurcation s/p Moreno-en-Y hepaticojejunostomy 2005, intrahepatic biliary strictures/sclerosing cholangitis w/ recurrent cholangitis s/p re-transplant in 2012 c/b HAT and chronic ischemic cholangiopathy with extensive infected bilomas (candida albicans, glabrata, VRE and Escherichia coli), incisional hernia s/p repair w/ SURGIPRO 4x6\" mesh on 6/24/2013, s/p 3rd liver transplant on 11/7/2018 CMV D-/R+ &  EBV D+/R+; increased-risk donor, and Hepatis C positive donor (VENTURA +), tx'ed w/ Mavyret to SVR, complicated by infected hepatic hematoma w/ VRE and pancreatitis w/ pancreatic necrosis since splenic artery was used for hepatic artery reconstruction and thrombosed splenic artery   # Chronic draining wound and 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 mesh w/ GBS and PsA s/p enterocutaneous fistula takedown and removal of associated infected mesh on 5/27/2020        Diabetes  alcoholic cirrhosis chronic renal insufficiency coronary disease history of DVT hyperparathyroid is him lupus anticoagulant portal hypertension sarcoidosis  History of MRSA positive nasal swab    Hospitalized at [facility] 1/2/2025 to 1/13/2025 pulmonary edema treated with empiric antibiotics cefepime vancomycin azithromycin    Presented with confusion change in mental status acute on chronic renal   And near syncopal episode 2/10/2025  failure acidosis lactic acid 3.5 creatinine 3.54  White blood cell count 11,600  Haemophilus influenza positive  Procalcitonin 3.6  Urinalysis showing  white cells per high-power field      Patient started on Tamiflu for influenza  Empiric coverage with meropenem and vancomycin          Review of Systems 
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 s/p re-transplant in 2012 c/b HAT and chronic ischemic cholangiopathy with extensive infected bilomas (candida albicans, glabrata, VRE and Escherichia coli), incisional hernia s/p repair w/ SURGIPRO 4x6\" mesh on 6/24/2013, s/p 3rd liver transplant on 11/7/2018 CMV D-/R+ &  EBV D+/R+; increased-risk donor, and Hepatis C positive donor (VENTURA +), tx'ed w/ Mavyret to SVR, complicated by infected hepatic hematoma w/ VRE and pancreatitis w/ pancreatic necrosis since splenic artery was used for hepatic artery reconstruction and thrombosed splenic artery   # Chronic draining wound and 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 mesh w/ GBS and PsA s/p enterocutaneous fistula takedown and removal of associated infected mesh on 5/27/2020        Diabetes  alcoholic cirrhosis chronic renal insufficiency coronary disease history of DVT hyperparathyroid is him lupus anticoagulant portal hypertension sarcoidosis  History of MRSA positive nasal swab    Hospitalized at [facility] 1/2/2025 to 1/13/2025 pulmonary edema treated with empiric antibiotics cefepime vancomycin azithromycin    Presented with confusion change in mental status acute on chronic renal   And near syncopal episode 2/10/2025  failure acidosis lactic acid 3.5 creatinine 3.54  White blood cell count 11,600  Haemophilus influenza positive  Procalcitonin 3.6  Urinalysis showing  white cells per high-power field      Patient started on Tamiflu for influenza  Empiric coverage with meropenem and vancomycin          Review of Systems 
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 s/p re-transplant in 2012 c/b HAT and chronic ischemic cholangiopathy with extensive infected bilomas (candida albicans, glabrata, VRE and Escherichia coli), incisional hernia s/p repair w/ SURGIPRO 4x6\" mesh on 6/24/2013, s/p 3rd liver transplant on 11/7/2018 CMV D-/R+ &  EBV D+/R+; increased-risk donor, and Hepatis C positive donor (VENTURA +), tx'ed w/ Mavyret to SVR, complicated by infected hepatic hematoma w/ VRE and pancreatitis w/ pancreatic necrosis since splenic artery was used for hepatic artery reconstruction and thrombosed splenic artery   # Chronic draining wound and 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 mesh w/ GBS and PsA s/p enterocutaneous fistula takedown and removal of associated infected mesh on 5/27/2020        Diabetes  alcoholic cirrhosis chronic renal insufficiency coronary disease history of DVT hyperparathyroid is him lupus anticoagulant portal hypertension sarcoidosis  History of MRSA positive nasal swab    Hospitalized at [facility] 1/2/2025 to 1/13/2025 pulmonary edema treated with empiric antibiotics cefepime vancomycin azithromycin    Presented with confusion change in mental status acute on chronic renal   And near syncopal episode 2/10/2025  failure acidosis lactic acid 3.5 creatinine 3.54  White blood cell count 11,600  Haemophilus influenza positive  Procalcitonin 3.6  Urinalysis showing  white cells per high-power field      Patient started on Tamiflu for influenza  Empiric coverage with meropenem and vancomycin          Review of Systems   Unable to perform ROS: 
Nephrology Progress Note    Assessment:  CKD 3b  Respiratory failure  Influenza-pneumonia  Hypernatremia  Hypokalemia      Plan:transfer to [facility] when bed available needs bolu dextrose  increase free H2)     Patient Active Problem List:     Pneumonia of both lower lobes due to infectious organism     GIB (gastrointestinal bleeding)     ZAIN (acute kidney injury)     Acute hypoxemic respiratory failure (HCC)     Altered mental status     Cardiomyopathy (HCC)     Influenza and pneumonia     UTI (urinary tract infection) due to Enterococcus      Subjective:  Admit Date: 2/14/2025    Interval History: stable    Medications:  Scheduled Meds:   [COMPLETED] potassium chloride  20 mEq IntraVENous Q1H    [Held by provider] insulin glargine  15 Units SubCUTAneous Daily    tacrolimus  1 mg Per NG tube Daily    insulin lispro  0-16 Units SubCUTAneous 6 times per day    chlorhexidine  15 mL Mouth/Throat BID    fludrocortisone  0.1 mg Oral Daily    vancomycin (VANCOCIN) intermittent dosing (placeholder)   Other RX Placeholder    pantoprazole (PROTONIX) 40 mg in sodium chloride (PF) 0.9 % 10 mL injection  40 mg IntraVENous Q12H    meropenem  500 mg IntraVENous Q12H     Continuous Infusions:   dexmedeTOMIDine (PRECEDEX) 1,000 mcg in sodium chloride 0.9 % 250 mL infusion 0.2 mcg/kg/hr (02/19/25 0915)    dextrose      heparin (PORCINE) Infusion 17 Units/kg/hr (02/19/25 0735)    norepinephrine 2 mcg/min (02/19/25 0735)    propofol 20 mcg/kg/min (02/19/25 0735)    fentaNYL 75 mcg/hr (02/19/25 0735)    sodium chloride         CBC:   Recent Labs     02/18/25  0536 02/19/25  0614   WBC 7.8 5.8   HGB 9.1* 9.2*   * 103*     CMP:    Recent Labs     02/18/25  0536 02/18/25  0755 02/19/25  0614   * 147* 151*   K 3.5 4.0 3.1*   * 111* 112*   CO2 28 26 29   BUN 68* 69* 53*   CREATININE 2.83* 2.66* 2.01*   GLUCOSE 77 79 92   CALCIUM 7.0* 7.2* 7.3*   LABGLOM 24.0* 25.9* 36.3*     Troponin: No results for input(s): \"TROPONINI\" in 
Nephrology Progress Note    Assessment:  Hypernatremia-hyperosm  Liver transplant   Hx Hepatitis-C  Influenza-A  DM type-2  Anemia          Plan: no need for dialysis   continue Levo to maintain >65 follow labs  Watch HGB  needs unit rbc    Patient Active Problem List:     Pneumonia of both lower lobes due to infectious organism     GIB (gastrointestinal bleeding)     ZAIN (acute kidney injury) (HCC)     Acute hypoxemic respiratory failure     Altered mental status     Cardiomyopathy (HCC)     Influenza and pneumonia     UTI (urinary tract infection) due to Enterococcus      Subjective:  Admit Date: 2/14/2025    Interval History: sedated    Medications:  Scheduled Meds:   [START ON 2/19/2025] insulin glargine  15 Units SubCUTAneous Daily    tacrolimus  1 mg Per NG tube Daily    insulin lispro  0-16 Units SubCUTAneous 6 times per day    chlorhexidine  15 mL Mouth/Throat BID    fludrocortisone  0.1 mg Oral Daily    vancomycin (VANCOCIN) intermittent dosing (placeholder)   Other RX Placeholder    pantoprazole (PROTONIX) 40 mg in sodium chloride (PF) 0.9 % 10 mL injection  40 mg IntraVENous Q12H    oseltamivir 6mg/ml  30 mg Oral Daily    meropenem  500 mg IntraVENous Q12H     Continuous Infusions:   dexmedeTOMIDine (PRECEDEX) 1,000 mcg in sodium chloride 0.9 % 250 mL infusion Stopped (02/17/25 1347)    dextrose      heparin (PORCINE) Infusion 15.04 Units/kg/hr (02/18/25 0843)    norepinephrine 2 mcg/min (02/18/25 0831)    propofol 15 mcg/kg/min (02/18/25 0831)    fentaNYL 50 mcg/hr (02/18/25 0831)    sodium chloride         CBC:   Recent Labs     02/17/25  2342 02/18/25  0536   WBC 5.2 7.8   HGB 8.7* 9.1*   PLT 99* 126*     CMP:    Recent Labs     02/18/25  0314 02/18/25  0536 02/18/25  0755   * 150* 147*   K 2.8* 3.5 4.0   * 112* 111*   CO2 28 28 26   BUN 70* 68* 69*   CREATININE 2.84* 2.83* 2.66*   GLUCOSE 82 77 79   CALCIUM 7.0* 7.0* 7.2*   LABGLOM 23.9* 24.0* 25.9*     Troponin: No results for input(s): 
Nephrology Progress Note    Assessment:  ZAIN  CKD 4 GFR 28 cc/min baseline  Uncontrolled DM  Cirrhosis portal hyper-Liver transplant  CAD  GIB       Plan:ventilator care  I&O  following GFR  pressors needed-BP   Sandostatin Iv     Patient Active Problem List:     Community acquired pneumonia, bilateral     GIB (gastrointestinal bleeding)     ZAIN (acute kidney injury) (HCC)     Acute hypoxemic respiratory failure     Altered mental status      Subjective:  Admit Date: 2/14/2025    Interval History: sedated  ventilator    Medications:  Scheduled Meds:   chlorhexidine  15 mL Mouth/Throat BID    insulin lispro  0-16 Units SubCUTAneous 4 times per day    fludrocortisone  0.1 mg Oral Daily    hydrocortisone sodium succinate PF  50 mg IntraVENous Q8H    dextrose  25 g IntraVENous Once    tacrolimus  1 mg Oral Daily    pantoprazole (PROTONIX) 40 mg in sodium chloride (PF) 0.9 % 10 mL injection  40 mg IntraVENous Q12H    oseltamivir 6mg/ml  30 mg Oral Daily    meropenem  500 mg IntraVENous Q12H    sodium bicarbonate  100 mEq IntraVENous Once     Continuous Infusions:   dextrose      sodium chloride 60 mL/hr at 02/15/25 0921    octreotide (SANDOSTATIN) 500 mcg in sodium chloride 0.9 % 100 mL infusion 25 mcg/hr (02/15/25 0733)    norepinephrine 16 mcg/min (02/15/25 0733)    propofol Stopped (02/15/25 0531)    fentaNYL 100 mcg/hr (02/15/25 0946)    sodium chloride         CBC:   Recent Labs     02/14/25  0646 02/14/25  1818 02/15/25  0759 02/15/25  1001   WBC 7.8  --  11.6*  --    HGB 12.3*   < > 10.8* 10.8*     --  139  --     < > = values in this interval not displayed.     CMP:    Recent Labs     02/14/25  1817 02/14/25  2054 02/14/25  2214 02/14/25  2241 02/15/25  0823 02/15/25  1001     --  139  --  143  --    K 6.2*  --  5.6*  --  4.6  --      --  103  --  105  --    CO2 13*  --  23  --  21  --    BUN 58*  --  58*  --  58*  --    CREATININE 3.07*   < > 3.30* 3.1* 3.51* 3.1*   GLUCOSE 144*  --  223*  --  
Nephrology Progress Note    Assessment:  ZAIN hypotension sepsis CKD 3b/4  Metabolic acidosis  Hypernatremia improved  DM type-2 fair control  Hx Liver transplant  Influenza-A  Immunocompromised d/t meds        Plan: continue levo for BP dose dextrose bolus    Patient Active Problem List:     Pneumonia of both lower lobes due to infectious organism     GIB (gastrointestinal bleeding)     ZAIN (acute kidney injury) (HCC)     Acute hypoxemic respiratory failure     Altered mental status     Cardiomyopathy (HCC)     Influenza and pneumonia      Subjective:  Admit Date: 2/14/2025    Interval History: sedated    Medications:  Scheduled Meds:   insulin glargine  8 Units SubCUTAneous Daily    chlorhexidine  15 mL Mouth/Throat BID    insulin lispro  0-16 Units SubCUTAneous 4 times per day    fludrocortisone  0.1 mg Oral Daily    hydrocortisone sodium succinate PF  50 mg IntraVENous Q8H    vancomycin (VANCOCIN) intermittent dosing (placeholder)   Other RX Placeholder    tacrolimus  1 mg Oral Daily    pantoprazole (PROTONIX) 40 mg in sodium chloride (PF) 0.9 % 10 mL injection  40 mg IntraVENous Q12H    oseltamivir 6mg/ml  30 mg Oral Daily    meropenem  500 mg IntraVENous Q12H    sodium bicarbonate  100 mEq IntraVENous Once     Continuous Infusions:   dextrose      heparin (PORCINE) Infusion 18 Units/kg/hr (02/16/25 0754)    norepinephrine 3 mcg/min (02/16/25 0754)    propofol Stopped (02/15/25 0531)    fentaNYL 100 mcg/hr (02/16/25 0754)    sodium chloride         CBC:   Recent Labs     02/15/25  0759 02/15/25  1001 02/16/25  0629   WBC 11.6*  --  8.9   HGB 10.8* 10.8* 10.3*     --  117*     CMP:    Recent Labs     02/16/25  0030 02/16/25  0628 02/16/25  0741    145* 146*   K 4.4 4.4 4.5    106 107   CO2 19* 22 22   BUN 58* 62* 61*   CREATININE 3.75* 3.64* 3.64*   GLUCOSE 216* 212* 227*   CALCIUM 6.5* 6.7* 6.5*   LABGLOM 17.2* 17.8* 17.8*     Troponin: No results for input(s): \"TROPONINI\" in the last 72 
Nephrology Progress Note    Assessment:  ZAIN hypotension sepsis CKD 3b/4  Metabolic acidosis  Hypernatremia improved  DM type-2 fair control  Hx Liver transplant  Influenza-A  Immunocompromised d/t meds      Plan:   - monitor labs, function stable and Na better   - plan to transfer to [facility]     Patient Active Problem List:     Pneumonia of both lower lobes due to infectious organism     GIB (gastrointestinal bleeding)     ZAIN (acute kidney injury) (HCC)     Acute hypoxemic respiratory failure     Altered mental status     Cardiomyopathy (HCC)     Influenza and pneumonia      Subjective:  Admit Date: 2/14/2025    Interval History: function stable, Na improved w/ dextrose bolus, voiding well, 1.4L past 24 hours    Medications:  Scheduled Meds:   tacrolimus  1 mg Per NG tube Daily    insulin lispro  0-16 Units SubCUTAneous 6 times per day    insulin glargine  25 Units SubCUTAneous Daily    chlorhexidine  15 mL Mouth/Throat BID    fludrocortisone  0.1 mg Oral Daily    hydrocortisone sodium succinate PF  50 mg IntraVENous Q8H    vancomycin (VANCOCIN) intermittent dosing (placeholder)   Other RX Placeholder    pantoprazole (PROTONIX) 40 mg in sodium chloride (PF) 0.9 % 10 mL injection  40 mg IntraVENous Q12H    oseltamivir 6mg/ml  30 mg Oral Daily    meropenem  500 mg IntraVENous Q12H    sodium bicarbonate  100 mEq IntraVENous Once     Continuous Infusions:   dextrose      heparin (PORCINE) Infusion 13 Units/kg/hr (02/17/25 0955)    norepinephrine Stopped (02/16/25 2216)    propofol 30 mcg/kg/min (02/17/25 0411)    fentaNYL 125 mcg/hr (02/17/25 0115)    sodium chloride         CBC:   Recent Labs     02/16/25  0629 02/17/25  0444   WBC 8.9 4.9   HGB 10.3* 8.8*   * 97*     CMP:    Recent Labs     02/17/25  0251 02/17/25  0444 02/17/25  0841   * 145* 144   K 4.4 4.0 4.1   * 106 106   CO2 23 24 26   BUN 71* 74* 75*   CREATININE 3.59* 3.41* 3.34*   GLUCOSE 188* 238* 241*   CALCIUM 6.5* 6.6* 6.6*   LABGLOM 
PHARMACY NOTE:   ICU Rounds Attended (10-15 minutes in patient room):    Pt diagnosis: ARF    IV Fluids: none   Renal:   Recent Labs     02/18/25  0314 02/18/25  0536 02/18/25  0755   CREATININE 2.84* 2.83* 2.66*    Estimated Creatinine Clearance: 28 mL/min (A) (based on SCr of 2.66 mg/dL (H)).     Antimicrobial Therapy:   Day 4   Antimicrobial agents: merrem, vanco, tamiflu  Cultures urine-e. Facalis,  respiratory-pseudomonas, MRSA  ID on consult: yes    Recent Labs     02/16/25  0629 02/17/25  0444 02/17/25  2342 02/18/25  0536   WBC 8.9 4.9 5.2 7.8      Pressors:   norepinephrine     Sedation:   Precedex   Fentanyl   Propofol     Insulin Therapy (goal: 140-180): high SSI  8 units given past 24 hours   Lantus 25-decreased per below   Recent Labs     02/18/25  0314 02/18/25  0536 02/18/25  0755   GLUCOSE 82 77 79     Steroid Therapy: florinef 0.1mg  Stress Ulcer Prophylaxis:   Pantoprazole q12h   On at home: same    DVT Prophylaxis/Anticoagulant Therapy: heparin drip  Recent Labs     02/17/25  0444 02/17/25  2342 02/18/25  0536   PLT 97* 99* 126*     Recent Labs     02/16/25  0700 02/17/25  0444   INR 1.4 1.2       Bowel Regimen:   none    Follow up/Changes:   - Decrease lantus to 15 units per verbal on rounds. 25 units already given this am, alerted to caution of possible hypoglycemia with current BG.   -home meds reviewed: follow up resume home gabapentin? Documented per RN as removed.       Jacqui Rodriguez [facility], PharmD   2/18/2025 10:46 AM      
PHARMACY NOTE:   Interdisciplinary Rounds Completed     Changes made today by Pharmacy:   Message sent to ID regarding continuation of abx therapy   40 IV K x 1 per verbal from So MICHEL on rounds  Lantus held monitor   monitor  Plt 103 monitor     Additional information:   Steroid: florinef 0.1 mg  Insulin coverage: high sliding scale with Humalog, utilized 0 units per sliding scale over the past 24 hours. Lantus Held  Pressors: levo  Sedation: precedex,fentanly, propofol  Antimicrobial therapy, day #6:merrem, vanco set to  after today. ID on consult. Resp: pseudomonas. MRSA nasal positive.  urine-e. Facalis  Core measures assessed/met    Jacqui Rodriguez RPH, PharmD  2025 10:32 AM   
Patient is in ICU, intubated, sedated and vented.  He continues to require Levophed drip    I discussed his case with nursing staff.  Minimal gastric suction.  Patient had received 1 unit of RBC transfusion    Chest exam revealed bilateral rhonchi  .  Heart is regular and tachycardic.  Abdomen soft.  Unable to assess for tenderness  Lower extremities no edema.    *Sepsis present on admission.  Septic shock developed within 1 to 2 hours after arrival to ICU yesterday around 6 PM.  Please refer to initial sepsis workup, diagnostic and therapeutic intervention according to sepsis guidelines completed in the emergency room department.  Patient received appropriate IV fluid infusion and antibiotic.  Cultures are completed.  Repeat lactic acid is negative.  Patient was started on Zosyn and vancomycin.  Subsequently switched to meropenem to cover ESBL.  Patient had received 1 dose of gentamicin as well.  Requested CT chest and abdomen to take a better look at the lung parenchyma and rule out intra-abdominal infectious process other than what is listed below.  Pending evaluation and further recommendation by ID team     *UTI.  Cultures pending.  Patient is on meropenem.     *Influenza respiratory infection.  Patient is unable to take Tamiflu p.o.  Tamiflu and NG if possible.  Currently NG suction.     *Basilar pneumonia.  Suspect community-acquired pneumonia versus aspiration.  Patient was started on Zosyn and subsequently switched to meropenem.  Received vancomycin yesterday.  Additional vancomycin dosing per pharmacy.    *Metabolic acidosis secondary to above associated with hyperkalemia.  Patient however has elevated anion gap and elevated hydroxybutyrate.  Could be an element of DKA.  Started patient on IV fluid infusion and insulin drip which was discontinued overnight.  Blood sugar is stable and his metabolic acidosis has resolved.     *ZAIN/CKD and hyperkalemia  Patient had received insulin R, D50 and calcium 
Patient is in ICU, intubated, sedated and vented.  He continues to require Levophed drip.  Levophed is down to 5 mics.  Yesterday it was 20 mics per    I discussed his case with night nursing staff.  Gastric suction is yellowish and not bloody.  The patient had multiple bowel movements yesterday.  None of them reported to be as bloody or black according to the night nurse  Patient had received 1 unit of RBC transfusion    Chest exam revealed bilateral rhonchi  .  Heart is regular and tachycardic.  Abdomen soft.  Unable to assess for tenderness  Lower extremities no edema.    *Sepsis present on admission.  Septic shock developed within 1 to 2 hours after arrival to ICU yesterday around 6 PM.  Please refer to initial sepsis workup, diagnostic and therapeutic intervention according to sepsis guidelines completed in the emergency room department.  Patient received appropriate IV fluid infusion and antibiotic.  Cultures are completed.  Repeat lactic acid is negative.  Continue meropenem and vancomycin as guided by infectious disease  .  Continues to take shock management as per intensivist     *UTI.  Culture is positive for Enterococcus.  Antibiotics to be addressed by ID team.      *Influenza respiratory infection.  Patient is unable to take Tamiflu p.o.  Tamiflu and NG if possible.       *Basilar pneumonia.  Suspect community-acquired pneumonia versus aspiration.  Continue meropenem and vancomycin as guided by ID team.     *ZAIN/CKD, metabolic acidosis and hyperkalemia  Defer management to nephrology and/or intensivist team     *Elevated troponin with abnormal EKG  Requested echocardiogram and start the patient on beta-blocker  Unfortunately due to possibility of upper GI bleed the patient would not qualify for antiplatelets or anticoagulation.  Requested cardiac evaluation.     *Upper GI bleed is suspected.  No further evidence of GI bleed.  Hemoglobin is stable.  Gastric content is yellowish.  Patient had multiple 
Patient on ventilator and sedated with propofol and fentanyl. Medications weaned down to do SBT and precedex started (see mar for titrations). Patient following simple commands by squeezing my hands, wiggling toes and nods appropriately.   
Patient with uneventful night.  Patient bp improved and able to titrate down on levo drip-see Mar for titration.  Patient with increased restlessness a few times overnight and increase in fentanyl drip helpful.  Electronically signed by Jacqui Matos RN on 2/16/2025 at 7:53 AM    
Progress Note  Date:2/15/2025       Room:[place] [address]  Patient Name:Oli Landaverde     YOB: 1960     Age:64 y.o.        Subjective    Subjective patient remains intubated, no signs of any active GI bleed  Review of Systems  Objective         Vitals Last 24 Hours:  TEMPERATURE:  Temp  Av.9 °F (38.8 °C)  Min: 99.8 °F (37.7 °C)  Max: 103 °F (39.4 °C)  RESPIRATIONS RANGE: Resp  Av.2  Min: 12  Max: 36  PULSE OXIMETRY RANGE: SpO2  Av.9 %  Min: 51 %  Max: 100 %  PULSE RANGE: Pulse  Av.7  Min: 86  Max: 120  BLOOD PRESSURE RANGE: Systolic (24hrs), Av , Min:54 , Max:153   ; Diastolic (24hrs), Av, Min:31, Max:72    I/O (24Hr):    Intake/Output Summary (Last 24 hours) at 2/15/2025 1356  Last data filed at 2/15/2025 1327  Gross per 24 hour   Intake 5112.98 ml   Output --   Net 5112.98 ml     Objective  Labs/Imaging/Diagnostics    Labs:  CBC:  Recent Labs     25  0646 02/14/25  1818 02/14/25  2054 02/14/25  2214 02/14/25  2241 02/15/25  0759 02/15/25  1001   WBC 7.8  --   --   --   --  11.6*  --    RBC 3.95*  --   --   --   --  3.48*  --    HGB 12.3* 10.2*   < > 9.4* 10.4* 10.8* 10.8*   HCT 37.7* 32.5*  --  28.6*  --  32.8*  --    MCV 95.4*  --   --   --   --  94.3*  --    RDW 13.9  --   --   --   --  14.7*  --      --   --   --   --  139  --     < > = values in this interval not displayed.     CHEMISTRIES:  Recent Labs     25 02/15/25  0823 02/15/25  1001 02/15/25  1323      < > 139  --  143  --  143   K 5.7*   < > 5.6*  --  4.6  --  4.3   CL 95   < > 103  --  105  --  105   CO2 19*   < > 23  --  21  --  24   BUN 56*   < > 58*  --  58*  --  57*   CREATININE 2.86*   < > 3.30*   < > 3.51* 3.1* 3.54*   GLUCOSE 308*   < > 223*  --  93  --  126*   PHOS  --   --   --   --   --   --  3.7   MG 2.0  --   --   --   --   --   --     < > = values in this interval not displayed.     PT/INR:  Recent Labs     
Pt dropped his BP down to 65 systolically and spiked fever 102.9.  Prior, his SBP has been around 100-110    Pt had received already Vanco and Zosyn.  I ordered one dose of Gentamicin  I will change Zosyn to Meropenem to add ESBL coverage  I ordered NS 1 L wide open  I ordered a stat H/H to exclude sudden and rapid acute blood loss.  ( Pt is already on PPI I V and Octerotide.    I also ordered Levophed drip.  ----------------------------------------------------------------------    SBP improved up to 96 sys    I called my colleague vlad hospitlalist Dr Byrne who is starting her shift.  I gave her sign out report about pt.  I asked her to visit pt, re evaluate him and assess needs, central line, A line etc.    I appreciate Dr Byrne help at night.      
Pt intubated ,23att . Called to pull ett 1 cm . Found ett @ 25att. Dr saab aware of where ett is at . Dr saab okay to leave ett where it is at.   
[facility]   Pharmacy Pharmacokinetic Monitoring Service - Vancomycin    Consulting Provider: Dr Hernandez   Indication: CAP  Target Concentration: Dosing based on anticipated concentration <15 mg/L due to renal impairment/insufficiency  Day of Therapy: 4  Additional Antimicrobials: merrem    Pertinent Laboratory Values:   Wt Readings from Last 1 Encounters:   02/17/25 79.8 kg (176 lb)     Temp Readings from Last 1 Encounters:   02/18/25 99.1 °F (37.3 °C)     Estimated Creatinine Clearance: 28 mL/min (A) (based on SCr of 2.66 mg/dL (H)).  Recent Labs     02/17/25  2342 02/18/25  0314 02/18/25  0536 02/18/25  0755   CREATININE  --    < > 2.83* 2.66*   BUN  --    < > 68* 69*   WBC 5.2  --  7.8  --     < > = values in this interval not displayed.     Procalcitonin:   Recent Labs     02/17/25  0841   PROCAL 33.98*       Pertinent Cultures:  Culture Date Source Results   2/15 Respiratory Pseudomonas - light growth   2/14 Blood x2 NGTD   MRSA Nasal Swab: showed MRSA positive result on 2/17    Recent vancomycin administrations                     vancomycin (VANCOCIN) 1,250 mg in sodium chloride 0.9 % 250 mL IVPB (ADDAVIAL) (mg) 1,250 mg New Bag 02/16/25 2108                    Assessment:  Date/Time Current Dose Concentration Timing of Concentration (h)   2/18 @ 0755 1250mg IV on 2/16 @ 2108 23.6mg/L 1 day 10hr 47m   Note: Serum concentrations collected for AUC dosing may appear elevated if collected in close proximity to the dose administered, this is not necessarily an indication of toxicity    Plan:  Current dosing regimen is supra-therapeutic  Pt is not on HD, given the patient's elevated SCr, will not order the vancomycin dose for today and draw a new level tomorrow to reassess dosing.  Repeat vancomycin concentration ordered for 02/19 @ 0600   Pharmacy will continue to monitor patient and adjust therapy as indicated    Thank you for the consult,    Pasquale Espinal, PharmD  PGY-1 Pharmacy Resident  [facility] 
[facility]   Pharmacy Dose Adjustment Per Protocol:  Zosyn Extended Interval Interchange      Oli Landaverde is a 64 y.o. male.     The following ordered dose of Zosyn has been changed to optimize its pharmacodynamic profile per [facility] pharmacy policy approved by [facility].    Recent Labs     02/14/25  0628 02/14/25  0646   CREATININE 2.6* 2.86*   BUN  --  56*   WBC  --  7.8     .  Height: 170.2 cm (5' 7\"), Weight - Scale: 75.8 kg (167 lb 1.6 oz), Body mass index is 26.17 kg/m².  Estimated Creatinine Clearance: 24 mL/min (A) (based on SCr of 2.86 mg/dL (H)).    Medication Ordered:   Traditional Dosing   [] Zosyn 2.25 gm q6-8 hr   [] Zosyn 3.375 gm q6-8 hr   [] Zosyn 4.5 gm q6-8 hr   [] Zosyn 2.25 gm q6-8 hr   [x] Zosyn 3.375 gm q6-8 hr   [] Zosyn 4.5 gm q6-8 hr     New Dose      Piperacillin/Tazobactam - Extended Infusion Dosing (4-hour infusion) - Preferred Dosing Strategy       Renal Function (CrCl mL/min)  >= 20  < 20, HD, PD  CRRT    All Indications - Loading dose of 4500 milligrams x 1 over 30 minutes or via IV push. Maintenance dose  should begin 6 hours after load for CrCl > 20 and 8 hours after load for CrCl < 20.    Maintenance dosing for all indications except as outlined below  [x] 3375mg q8h  [] 3375mg q12h  [] 3375mg q8h    Febrile neutropenia, Cystic fibrosis, BMI > 40*, local Pseudomonas susceptibility < 80% (refer to page 3 for indications)     [] 4500mg q8h  [] 4500 q12h  [] 4500mg q8h    *Consider 3375mg q8h for indication of Urinary tract infections in BMI > 40. Adjust for decreased renal function.           Thank You,  Janine Stewart [facility]  2/14/2025 3:25 PM   
[facility]   Pharmacy Dose Adjustment Per Protocol:  Meropenem Extended Interval Interchange    Oli Landaverde is a 64 y.o. male.     The following ordered dose of Meropenem has been changed to optimize its pharmacodynamic profile per [facility] pharmacy policy approved by [facility].    Recent Labs     02/18/25  0536 02/18/25  0755 02/19/25  0614 02/19/25  1204   CREATININE 2.83* 2.66* 2.01* 1.7*   BUN 68* 69* 53*  --    WBC 7.8  --  5.8  --      .  Height: 170 cm (5' 6.93\"), Weight - Scale: 75.8 kg (167 lb), Body mass index is 26.21 kg/m².  Estimated Creatinine Clearance: 41 mL/min (A) (based on SCr of 1.7 mg/dL (H)).    Ordered Dose    __ 500 mg IV every 8 hrs  (30 minute infusion)    _x_ 1 gm  IV every 8  hrs (30 minute infusion)    __ 2 gm IV every 8 hrs (30 minute infusion)    New Dose    Meropenem - Extended Infusion (3-hour infusion) - Preferred Dosing Strategy    Renal function (CrCl mL/min)  >= 50  26 - 49  10 - 25   < 10, HD, PD  CRRT    All indications - Loading dose of 0405-7941 milligrams x 1 over 30 minutes or via IV push (based on indication). Maintenance dose should begin at the next regularly scheduled dosing interval based on indication/renal function.    Maintenance dosing for all indications except as outlined below  1000mg q8h ¨ 1000mg q12h x 500 mg q12h ¨ 500 mg q24h ¨ 1000mg q12h^ ¨   CNS infections, Cystic fibrosis, FAISAL > 4  2000mg q8h ¨ 2000mg q12h ¨ 1000mg q12h ¨ 1000mg q24h ¨ 2000mg q12h† ¨    ^Consider 1000mg q8h for CRRT effluent rates > 3L/h   †Consider 2000mg q8h for CRRT effluent rates >= 3L/h       Pharmacists should be contacted for issues concerning drug compatibility with multiple IV medications.  All doses will be prepared using 100ml bag to be infused over 3-hours at a rate of 33.3 ml/hr.    Thank You,  Janine Stewart [facility]  2/19/2025 1:12 PM   
[facility]   Pharmacy Pharmacokinetic Monitoring Service - Vancomycin     Oli Landaverde is a 64 y.o. male starting on vancomycin therapy for CAP. Pharmacy consulted by Dr Hernandez for monitoring and adjustment.    Target Concentration: Dosing based on anticipated concentration <15 mg/L due to renal impairment/insufficiency    Additional Antimicrobials: merrem    Pertinent Laboratory Values:   Wt Readings from Last 1 Encounters:   02/15/25 75.8 kg (167 lb)     Temp Readings from Last 1 Encounters:   02/15/25 (!) 102.3 °F (39.1 °C) (Oral)     Estimated Creatinine Clearance: 22 mL/min (A) (based on SCr of 3.1 mg/dL (H)).  Recent Labs     02/14/25  0646 02/14/25  1817 02/14/25  2214 02/14/25  2241 02/15/25  0759 02/15/25  0823 02/15/25  1001   CREATININE 2.86*   < > 3.30*   < >  --  3.51* 3.1*   BUN 56*   < > 58*  --   --  58*  --    WBC 7.8  --   --   --  11.6*  --   --     < > = values in this interval not displayed.     Procalcitonin: 49.24    Pertinent Cultures:  Culture Date Source Results   02/14 blood NGTD       Plan:  Concentration-guided dosing due to renal impairment/insufficiency  Vancomycin 2000 mg given x 1 02/14/25 0840  Goal trough concentration of < 15 at steady state  Current trough 21.2 --will re-dose when trough < 15  Renal labs as indicated   Vancomycin concentration ordered for 02/16 @ 0600   Pharmacy will continue to monitor patient and adjust therapy as indicated    Thank you for the consult,  Toña Joel RPH  2/15/2025 10:44 AM    
[facility]   Pharmacy Dose Adjustment Per Protocol:  Meropenem Extended Interval Interchange    Oli Landaverde is a 64 y.o. male.     The following ordered dose of Meropenem has been changed to optimize its pharmacodynamic profile per [facility] pharmacy policy approved by [facility].    Recent Labs     02/14/25  0646 02/14/25  1817   CREATININE 2.86* 3.07*   BUN 56* 58*   WBC 7.8  --      .  Height: 170.2 cm (5' 7\"), Weight - Scale: 75.8 kg (167 lb 1.6 oz), Body mass index is 26.17 kg/m².  Estimated Creatinine Clearance: 23 mL/min (A) (based on SCr of 3.07 mg/dL (H)).    Ordered Dose    1 gm  IV every 12  hrs (180 minute infusion)      Meropenem - Extended Infusion (3-hour infusion) - Preferred Dosing Strategy    Renal function (CrCl mL/min)  >= 50  26 - 49  10 - 25   < 10, HD, PD  CRRT    All indications - Loading dose of 1687-2077 milligrams x 1 over 30 minutes or via IV push (based on indication). Maintenance dose should begin at the next regularly scheduled dosing interval based on indication/renal function.    Maintenance dosing for all indications except as outlined below  1000mg q8h ¨ 1000mg q12h ¨ 500 mg q12h  mg q24h ¨ 1000mg q12h^ ¨   CNS infections, Cystic fibrosis, FAISAL > 4  2000mg q8h ¨ 2000mg q12h ¨ 1000mg q12h ¨ 1000mg q24h ¨ 2000mg q12h† ¨    ^Consider 1000mg q8h for CRRT effluent rates > 3L/h   †Consider 2000mg q8h for CRRT effluent rates >= 3L/h       Pharmacists should be contacted for issues concerning drug compatibility with multiple IV medications.  All doses will be prepared using 100ml bag to be infused over 3-hours at a rate of 33.3 ml/hr.    Thank You,  Bairon Turk, [facility]  2/14/2025 7:26 PM   
[facility]   Pharmacy Pharmacokinetic Monitoring Service - Vancomycin    Consulting Provider: Dr Hernandez   Indication: CAP  Target Concentration: Dosing based on anticipated concentration <15 mg/L due to renal impairment/insufficiency  Day of Therapy: 3  Additional Antimicrobials: merrem    Pertinent Laboratory Values:   Wt Readings from Last 1 Encounters:   02/19/25 75.8 kg (167 lb)     Temp Readings from Last 1 Encounters:   02/19/25 99.3 °F (37.4 °C)     Estimated Creatinine Clearance: 41 mL/min (A) (based on SCr of 1.7 mg/dL (H)).  Recent Labs     02/18/25  0536 02/18/25  0755 02/19/25  0614 02/19/25  1204   CREATININE 2.83* 2.66* 2.01* 1.7*   BUN 68* 69* 53*  --    WBC 7.8  --  5.8  --      Procalcitonin:   Recent Labs     02/17/25  0841   PROCAL 33.98*       Pertinent Cultures:  Culture Date Source Results   2/15 Respiratory Pseudomonas - pansensitive   2/14 Blood x2 NGTD   MRSA Nasal Swab: showed MRSA positive result on 2/17    Recent vancomycin administrations                     vancomycin (VANCOCIN) 1,250 mg in sodium chloride 0.9 % 250 mL IVPB (ADDAVIAL) (mg) 1,250 mg New Bag 02/16/25 2108                   Assessment:  Date/Time Current Dose Concentration Timing of Concentration (h)    02/19/25 0614 1250mg IV on 2/16 15.7 mg/L 2 days 9hrs 6min   2/18 @ 0755 1250mg IV on 2/16 @ 2108 23.6 mg/L 1 day 10hr 47m   Note: Serum concentrations collected for AUC dosing may appear elevated if collected in close proximity to the dose administered, this is not necessarily an indication of toxicity      Plan:  Current dosing regimen is supra-therapeutic  Pt is not on HD, improved SCr today, will order one time dose of vancomycin 1000 mg today.  Repeat vancomycin concentration ordered for 02/20 @ 1300   Pharmacy will continue to monitor patient and adjust therapy as indicated    Thank you for the consult,    Pasquale Espinal, PharmD  PGY-1 Pharmacy Resident  [facility]  [phone]      
[facility]   Pharmacy Pharmacokinetic Monitoring Service - Vancomycin    Consulting Provider: Dr Hernandez   Indication: CAP  Target Concentration: Dosing based on anticipated concentration <15 mg/L due to renal impairment/insufficiency  Day of Therapy: 2  Additional Antimicrobials: merrem    Pertinent Laboratory Values:   Wt Readings from Last 1 Encounters:   02/16/25 78.6 kg (173 lb 4.8 oz)     Temp Readings from Last 1 Encounters:   02/16/25 98.8 °F (37.1 °C) (Axillary)     Estimated Creatinine Clearance: 19 mL/min (A) (based on SCr of 3.64 mg/dL (H)).  Recent Labs     02/15/25  0759 02/15/25  0823 02/16/25  0628 02/16/25  0629 02/16/25  0741   CREATININE  --    < > 3.64*  --  3.64*   BUN  --    < > 62*  --  61*   WBC 11.6*  --   --  8.9  --     < > = values in this interval not displayed.         Pertinent Cultures:  Culture Date Source Results   02/14  Blood, urine NGTD, enterococcus   MRSA Nasal Swab: N/A. Non-respiratory infection.    Recent vancomycin administrations                     vancomycin (VANCOCIN) 2000 mg in 400 mL IVPB (mg) 2,000 mg New Bag 02/14/25 0840                    Assessment:  Date/Time Current Dose Concentration Timing of Concentration (h) AUC   02/16 Last dose 2000 mg x 1 02/14 0840 15.9 trough NA   Note: Serum concentrations collected for AUC dosing may appear elevated if collected in close proximity to the dose administered, this is not necessarily an indication of toxicity    Plan:  Current dosing regimen is slightly supra-therapeutic  Will re-dose with vancomycin 1250 mg x 1 in 12 hours   Repeat vancomycin concentration ordered for 02/16 @ 2030   Pharmacy will continue to monitor patient and adjust therapy as indicated    Thank you for the consult,  Toña Joel RPH  2/16/2025 9:22 AM   
Received bedside report from Amanda BOBO. Skin assessment completed with night shift RN and myself at bedside. Morning assessment completed and medications given. Pt placed on    
Received bedside report from Amanda BOBO. Skin assessment completed with night shift RN and myself at bedside. Morning assessment completed by myself and nursing student. Medications given by nursing student and instructor.    
Renal Adjustment Per Protocol:   Recent Labs     02/17/25  0841   CREATININE 3.34*   Estimated Creatinine Clearance: 23 mL/min (A) (based on SCr of 3.34 mg/dL (H)).  .  Continue reduced dose 30 mg daily    Phoebe Melendrez [facility] PharmD  
Shift Summary    1915 Received handoff report from Chaya BOBO at bedside. Skin was reviewed. Dayshift updates given. Gtts reviewed. All safety measures in place, bed in lowest position, bed alarm on, restraints applied correctly.     2020 Patient POC glucose 62. PRN D10 bolus given per orders.    2045 Blood glucose 107 after D10 bolus.     2136 Heparin bolus given and gtts adjusted based on Xa results.     0500 Full bed bath and linen change done, patient tolerated well.    0710 Handoff report given to Herminia BOBO. Skin reviewed. Gtts reviewed. All safety measures in place, bed in lowest position, bed alarm on, restraints applied correctly. Restraint documentation reviewed for completion.  
Shift Summary    1915 Received handoff report from JIHAN Hope at bedside. Skin was reviewed. Gtts reviewed. All safety measures in place, bed in lowest position, bed alarm on, restraints applied correctly.    0324 Patient urinating small amounts at a time into external catheter throughout the night. 100cc total in cannister from [id number]. Bladder feels distended. Bladder scanned patient for a volume of 999+. Obtained straight cath order and got 1,050cc out at 0328.    0400 Full bed bath given at this time with complete linen change. Small bowel movement noted. Patient tolerated well.     0715 Handoff report given to Chaya BOBO at bedside. Skin reviewed. Gtts reviewed. All safety measures in place, bed in lowest position, bed alarm on, restraints applied correctly. Restraint documentation reviewed for completion.    
Shift Summary    1928 Received handoff from Chaya RN at bedside. Skin reviewed. Gtts reviewed. All safety measures in place, bed in lowest position, bed alarm on, restraints applied correctly.    2146 Pt temperature not reading. Due to frequent bowel movements, esophageal temp probe placed. Reading 35.2C (95.4F). Yannick hugger applied on highest setting at this time.    2211 Bladder scanned patient for 416mL. PerfectServed Dr. Cardoso to let him know this would be the 3rd straight cath in 18 hours, requesting another straight cath. Ordered to put in jones at this time. Temperature jones inserted, will use bladder for temp source now.    2251 PerfectServed Dr Cardoso about new bleeding from central line and small amount of blood tinged sputum. Decreasing platelet count over past coulple days with last one being 97. Concern for heparin drip running and history of liver transplant. Ordered to check CBC at this time.    2335 Restarted levophed drip at this time. See MAR for titrations, see flowsheets for BP.    0029 Platelet levels resulted stable at 99. PerfectServed Dr. Cardoso to let him know, no new orders at this time.    0030 PerfectServed Dr. Cardoso at this time about K of 2.8. New orders given.    0710 Handoff report given to Chaya BOBO at bedside. Skin reviewed. Gtts reviewed. All safety measures in place, bed in lowest position, bed alarm on, restraints applied correctly.  
Spiritual Care Services     Summary of Visit:   participated in ICU rounds. Patient intubated and will possibly have a breathing trial later today.    Encounter Summary  Encounter Overview/Reason: Interdisciplinary rounds  Service Provided For: Patient  Referral/Consult From: Rounding  Support System: Spouse, Children  Complexity of Encounter: Low  Begin Time: 1030  End Time : 1045  Total Time Calculated: 15 min                               Spiritual Assessment/Intervention/Outcomes:    Assessment: Unable to assess    Intervention: Sustaining Presence/Ministry of presence    Outcome: Did not respond      Care Plan:    Plan and Referrals  Plan/Referrals: Continue Support (comment)     to provide additional spiritual support as needed or requested      Spiritual Care Services   Electronically signed by Chaplain Mai on 2/19/2025 at 10:12 AM.    To reach a  for emotional and spiritual support, place an EPIC consult request.   If a  is needed immediately, dial “0” and ask to page the on-call .   
Spiritual Health History and Assessment/Progress Note  [facility]    Initial Encounter, Spiritual/Emotional Needs,  ,  ,      Name: Oli Landaverde MRN: [id number]    Age: 64 y.o.     Sex: male   Language: English   Spiritism: None   Acute hypoxemic respiratory failure     Date: 2/16/2025            Total Time Calculated: 15 min              Spiritual Assessment began in [facility] ICU        Referral/Consult From: Rounding   Encounter Overview/Reason: Initial Encounter, Spiritual/Emotional Needs  Service Provided For: Patient     visited patient in ICU. Patient is currently intubated. Patient's wife is also a patient on the hospital.     Kim, Belief, Meaning:   Patient unable to assess at this time  Family/Friends No family/friends present      Importance and Influence:  Patient unable to assess at this time  Family/Friends No family/friends present    Community:  Patient Other: Unable to assess  Family/Friends No family/friends present    Assessment and Plan of Care:     Patient Interventions include: Other: Ministry of presence  Family/Friends Interventions include: No family/friends present    Patient Plan of Care: Spiritual Care available upon further referral  Family/Friends Plan of Care: Spiritual Care available upon further referral    Electronically signed by Chaplain Mai on 2/16/2025 at 11:06 AM   
Wife requested to transfer patient to [facility] on Friday night.  According to Dr. Byrne.    I called wife yesterday but she was sick herself being taken to the emergency room by squad.    Patient was not necessarily stable for transfer requiring high dose of Levophed drip.    This morning, the patient is more stable than yesterday.  His Levophed is down to 5 mics  He is intubated and vented  His metabolic derangement have been corrected for the most part.    I called [facility] access line and submitted the request to transfer patient to [facility] ICU to be handled by ICU staff as well as liver transplant team.    Waiting for them to call me back and a subsequent bed availability.  
Intubated      Physical Exam  Constitutional:       Appearance: He is ill-appearing.   Cardiovascular:      Heart sounds: Normal heart sounds. No murmur heard.  Pulmonary:      Effort: No respiratory distress.      Breath sounds: Rhonchi present. No wheezing or rales.   Abdominal:      General: Abdomen is flat. Bowel sounds are normal. There is no distension.      Tenderness: There is no abdominal tenderness. There is no guarding.         Blood pressure (!) 130/54, pulse 63, temperature 99.3 °F (37.4 °C), resp. rate 28, height 1.7 m (5' 6.93\"), weight 75.8 kg (167 lb), SpO2 95%.      .   Lab Results   Component Value Date    WBC 5.8 02/19/2025    HGB 8.7 (L) 02/19/2025    HCT 28.1 (L) 02/19/2025    MCV 95.3 (H) 02/19/2025     (L) 02/19/2025     Lab Results   Component Value Date/Time     02/19/2025 06:14 AM    K 3.1 02/19/2025 06:14 AM    K 3.5 02/18/2025 05:36 AM     02/19/2025 06:14 AM    CO2 29 02/19/2025 06:14 AM    BUN 53 02/19/2025 06:14 AM    CREATININE 1.7 02/19/2025 12:04 PM    CREATININE 2.01 02/19/2025 06:14 AM    GLUCOSE 92 02/19/2025 06:14 AM    CALCIUM 7.3 02/19/2025 06:14 AM    LABGLOM 44 02/19/2025 12:04 PM        Urinalysis with Reflex to Culture [[id number]] (Abnormal) Collected: 02/14/25 0826      Specimen: Urine Updated: 02/14/25 0905      Color, UA Yellow      Clarity, UA CLOUDY      Glucose, Ur >=1000 mg/dL       Bilirubin, Urine Negative      Ketones, Urine TRACE mg/dL       Specific Gravity, UA 1.018      Blood, Urine MODERATE      pH, Urine 5.5      Protein,  mg/dL       Urobilinogen, Urine 0.2 E.U./dL       Nitrite, Urine Negative      Leukocyte Esterase, Urine Negative      Urine Reflex to Culture Yes     Microscopic Urinalysis [[id number]] (Abnormal) Collected: 02/14/25 0826      Updated: 02/14/25 0905      Hyaline Casts, UA 0-1 /LPF       Bacteria, UA Negative /HPF       WBC, UA  /HPF       RBC, UA 20-50 /HPF       Epithelial Cells, UA 6-10 /HPF  
tender, tympanic  Heart:: Heart sounds are normal.  Regular rate and rhythm without murmur, gallop or rub.  ABD:  symmetric, soft, non-tender, no guarding or rebound  Musculoskeletal : no cyanosis, no clubbing, and no edema  Neuro:   Sedated on vent  Skin: No rashes or nodules noted.  Lymph node:  no cervical nodes  Urology: No [facility]   Psychiatric: Calm    Medications:  Scheduled Meds:   chlorhexidine  15 mL Mouth/Throat BID    insulin lispro  0-16 Units SubCUTAneous 4 times per day    fludrocortisone  0.1 mg Oral Daily    hydrocortisone sodium succinate PF  50 mg IntraVENous Q8H    vancomycin (VANCOCIN) intermittent dosing (placeholder)   Other RX Placeholder    tacrolimus  1 mg Oral Daily    pantoprazole (PROTONIX) 40 mg in sodium chloride (PF) 0.9 % 10 mL injection  40 mg IntraVENous Q12H    oseltamivir 6mg/ml  30 mg Oral Daily    meropenem  500 mg IntraVENous Q12H    sodium bicarbonate  100 mEq IntraVENous Once       PRN Meds:  glucose, dextrose bolus **OR** dextrose bolus, glucagon (rDNA), dextrose, heparin (porcine), heparin (porcine), acetaminophen, sodium chloride    Results: reviewed by me   CBC:   Recent Labs     02/14/25  0646 02/14/25  1818 02/14/25  2214 02/14/25  2241 02/15/25  0759 02/15/25  1001 02/16/25  0629   WBC 7.8  --   --   --  11.6*  --  8.9   HGB 12.3*   < > 9.4*   < > 10.8* 10.8* 10.3*   HCT 37.7*   < > 28.6*  --  32.8*  --  32.7*   MCV 95.4*  --   --   --  94.3*  --  95.1*     --   --   --  139  --  117*    < > = values in this interval not displayed.     BMP:   Recent Labs     02/15/25  1937 02/16/25  0030 02/16/25  0628 02/16/25  0741   * 143 145* 146*   K 4.4 4.4 4.4 4.5    104 106 107   CO2 23 19* 22 22   PHOS 4.0 4.7  --  5.0*   BUN 58* 58* 62* 61*   CREATININE 3.68* 3.75* 3.64* 3.64*     LIVER PROFILE:   Recent Labs     02/14/25  0646 02/15/25  0823 02/16/25  0628   AST 39 72* 58*   ALT 23 30 32   BILITOT 0.3 0.3 0.4   ALKPHOS 96 62 82     PT/INR:   Recent Labs     
grossly unremarkable. SINUSES: There is mild mucosal thickening of the sphenoid sinus posteriorly on the left. SOFT TISSUES/SKULL:  No displaced calvarial fracture.  There is atherosclerotic calcification of the cavernous carotid arteries and the distal vertebral arteries.  Soft tissue structures at the axial level of the skull base appear grossly unremarkable.  There is atherosclerotic calcification of the cavernous carotid arteries and the distal left vertebral artery.     Encephalomalacia on the basis of prior infarction within the left frontal lobe. No CT evidence of acute intracranial abnormality.       2D Echo:     No results found for this or any previous visit.      Stress Test:     No results found for this or any previous visit.  No results found for this or any previous visit from the past 365 days.       No results found for this or any previous visit from the past 365 days.      ASSESSMENT:    Active Hospital Problems    Diagnosis Date Noted    Acute hypoxemic respiratory failure [J96.01] 02/14/2025     Priority: Low    Altered mental status [R41.82] 02/14/2025     Priority: Low     Elevated troponin (169, 174)-- mildly elevated --likely in the setting of CKD and sepsis  NL LV systolic function, EF 66% per echo in 2023 at [facility] (during hospitalization)  Diastolic heart failure  Atrial Fibrillation --not on oral anticoagulation per chart review  [id number] score: 5--indicating high risk for stroke  Alcoholic liver cirrhosis status post liver transplant x 3  Hypertension  Type 2 diabetes mellitus  History of DVT/PE  History of CVA  History of substance use disorder  CKD  Influenza A positive  Sepsis?  Aspiration pneumonia?      PLAN:   TTE today EF 60 to 65% mild aortic stenosis  ICU management   Start beta-blocker once patient is more hemodynamically stable  Patient may not be a good candidate for long-term anticoagulation given history of portal hypertension and concerns for GI bleed  Continue to